# Patient Record
Sex: FEMALE | Race: WHITE | NOT HISPANIC OR LATINO | Employment: OTHER | URBAN - METROPOLITAN AREA
[De-identification: names, ages, dates, MRNs, and addresses within clinical notes are randomized per-mention and may not be internally consistent; named-entity substitution may affect disease eponyms.]

---

## 2023-10-11 ENCOUNTER — HOSPITAL ENCOUNTER (OUTPATIENT)
Dept: RADIOLOGY | Facility: HOSPITAL | Age: 73
Discharge: HOME | End: 2023-10-11
Payer: MEDICARE

## 2023-10-11 VITALS — HEIGHT: 65 IN | WEIGHT: 168 LBS | BODY MASS INDEX: 27.99 KG/M2

## 2023-10-11 DIAGNOSIS — R92.8 OTHER ABNORMAL AND INCONCLUSIVE FINDINGS ON DIAGNOSTIC IMAGING OF BREAST: ICD-10-CM

## 2023-10-11 PROCEDURE — 77065 DX MAMMO INCL CAD UNI: CPT | Mod: RIGHT SIDE | Performed by: RADIOLOGY

## 2023-10-11 PROCEDURE — 77061 BREAST TOMOSYNTHESIS UNI: CPT | Mod: RT

## 2023-10-11 PROCEDURE — 76642 ULTRASOUND BREAST LIMITED: CPT | Mod: RIGHT SIDE | Performed by: RADIOLOGY

## 2023-10-11 PROCEDURE — 76642 ULTRASOUND BREAST LIMITED: CPT | Mod: RT

## 2023-10-11 PROCEDURE — G0279 TOMOSYNTHESIS, MAMMO: HCPCS | Mod: RIGHT SIDE | Performed by: RADIOLOGY

## 2023-10-31 PROBLEM — L84 PRE-ULCERATIVE CORN OR CALLOUS: Status: ACTIVE | Noted: 2023-08-31

## 2023-10-31 PROBLEM — K63.5 COLONIC POLYP: Status: ACTIVE | Noted: 2023-07-26

## 2023-10-31 PROBLEM — H40.003 GLAUCOMA SUSPECT OF BOTH EYES: Status: ACTIVE | Noted: 2023-06-12

## 2023-10-31 PROBLEM — H43.813 VITREOUS DEGENERATION OF BOTH EYES: Status: ACTIVE | Noted: 2023-06-12

## 2023-10-31 PROBLEM — G47.30 SLEEP APNEA: Status: ACTIVE | Noted: 2017-12-08

## 2023-10-31 PROBLEM — I63.9 CEREBRAL INFARCTION (MULTI): Status: ACTIVE | Noted: 2020-09-17

## 2023-10-31 PROBLEM — E06.3 HASHIMOTO'S DISEASE: Status: ACTIVE | Noted: 2021-01-29

## 2023-10-31 PROBLEM — F41.9 ANXIETY: Status: ACTIVE | Noted: 2022-02-18

## 2023-10-31 PROBLEM — M17.9 OSTEOARTHRITIS OF KNEE: Status: ACTIVE | Noted: 2017-12-08

## 2023-10-31 PROBLEM — F41.1 GENERALIZED ANXIETY DISORDER: Status: ACTIVE | Noted: 2020-07-17

## 2023-10-31 PROBLEM — E56.9 VITAMIN DEFICIENCY: Status: ACTIVE | Noted: 2017-12-08

## 2023-10-31 PROBLEM — N60.19 FIBROCYSTIC DISEASE OF BREAST: Status: ACTIVE | Noted: 2017-12-08

## 2023-10-31 PROBLEM — J34.89 NASAL DRYNESS: Status: ACTIVE | Noted: 2020-10-06

## 2023-10-31 PROBLEM — G60.9 IDIOPATHIC PERIPHERAL NEUROPATHY: Status: ACTIVE | Noted: 2017-12-08

## 2023-10-31 PROBLEM — M19.90 OSTEOARTHROSIS: Status: ACTIVE | Noted: 2017-12-08

## 2023-10-31 PROBLEM — E78.5 HYPERLIPIDEMIA: Status: ACTIVE | Noted: 2017-12-08

## 2023-10-31 PROBLEM — E61.1 IRON DEFICIENCY: Status: ACTIVE | Noted: 2023-08-31

## 2023-10-31 PROBLEM — F32.A DEPRESSION: Status: ACTIVE | Noted: 2017-09-15

## 2023-10-31 PROBLEM — Q76.2 CONGENITAL SPONDYLOLISTHESIS: Status: ACTIVE | Noted: 2018-08-30

## 2023-10-31 PROBLEM — R73.01 IMPAIRED FASTING GLUCOSE: Status: ACTIVE | Noted: 2021-01-29

## 2023-10-31 PROBLEM — G47.62 NOCTURNAL LEG CRAMPS: Status: ACTIVE | Noted: 2022-06-08

## 2023-10-31 PROBLEM — T14.8XXA BRUISE: Status: ACTIVE | Noted: 2021-04-22

## 2023-10-31 PROBLEM — R94.31 ABNORMAL EKG: Status: ACTIVE | Noted: 2023-10-31

## 2023-10-31 PROBLEM — R04.0 BLEEDING FROM THE NOSE: Status: ACTIVE | Noted: 2023-07-26

## 2023-10-31 PROBLEM — K42.9 UMBILICAL HERNIA WITHOUT OBSTRUCTION AND WITHOUT GANGRENE: Status: ACTIVE | Noted: 2018-08-30

## 2023-10-31 PROBLEM — M54.50 LOW BACK PAIN: Status: ACTIVE | Noted: 2017-12-08

## 2023-10-31 PROBLEM — E66.3 OVERWEIGHT: Status: ACTIVE | Noted: 2023-07-26

## 2023-10-31 PROBLEM — L71.9 ROSACEA: Status: ACTIVE | Noted: 2017-12-08

## 2023-10-31 PROBLEM — I65.29 CAROTID STENOSIS: Status: ACTIVE | Noted: 2023-10-31

## 2023-10-31 PROBLEM — I25.10 CORONARY ATHEROSCLEROSIS: Status: ACTIVE | Noted: 2017-12-08

## 2023-10-31 PROBLEM — G47.00 INSOMNIA: Status: ACTIVE | Noted: 2017-12-08

## 2023-10-31 PROBLEM — I10 BENIGN ESSENTIAL HYPERTENSION: Status: ACTIVE | Noted: 2017-12-08

## 2023-10-31 PROBLEM — H04.129 TEAR FILM INSUFFICIENCY: Status: ACTIVE | Noted: 2017-12-08

## 2023-10-31 PROBLEM — M79.609 PAIN IN LIMB: Status: ACTIVE | Noted: 2017-12-08

## 2023-10-31 PROBLEM — E04.2 MULTIPLE THYROID NODULES: Status: ACTIVE | Noted: 2023-08-23

## 2023-10-31 PROBLEM — M79.7 FIBROMYALGIA: Status: ACTIVE | Noted: 2017-09-15

## 2023-10-31 PROBLEM — M25.9 JOINT DISORDER OF KNEE: Status: ACTIVE | Noted: 2017-09-15

## 2023-10-31 PROBLEM — M72.2 PLANTAR FASCIITIS: Status: ACTIVE | Noted: 2019-02-15

## 2023-10-31 PROBLEM — H52.203 ASTIGMATISM OF BOTH EYES: Status: ACTIVE | Noted: 2023-06-12

## 2023-10-31 PROBLEM — H52.4 PRESBYOPIA: Status: ACTIVE | Noted: 2023-06-12

## 2023-10-31 PROBLEM — F43.21 GRIEF: Status: ACTIVE | Noted: 2017-09-15

## 2023-10-31 PROBLEM — E55.9 VITAMIN D DEFICIENCY: Status: ACTIVE | Noted: 2023-10-31

## 2023-10-31 PROBLEM — R00.1 SINUS BRADYCARDIA: Status: ACTIVE | Noted: 2023-07-26

## 2023-10-31 PROBLEM — K21.9 GASTROESOPHAGEAL REFLUX DISEASE: Status: ACTIVE | Noted: 2017-12-08

## 2023-10-31 PROBLEM — Z96.1 PSEUDOPHAKIA: Status: ACTIVE | Noted: 2023-06-12

## 2023-10-31 PROBLEM — E78.2 MIXED HYPERLIPIDEMIA: Status: ACTIVE | Noted: 2017-12-08

## 2023-10-31 PROBLEM — L84 PLANTAR CALLOSITY: Status: ACTIVE | Noted: 2023-07-26

## 2023-10-31 PROBLEM — H04.123 DRY EYE SYNDROME OF BOTH EYES: Status: ACTIVE | Noted: 2023-06-12

## 2023-10-31 PROBLEM — I10 REACTIVE HYPERTENSION: Status: ACTIVE | Noted: 2023-07-26

## 2023-10-31 PROBLEM — I77.9 CAROTID ARTERY DISEASE (CMS-HCC): Status: ACTIVE | Noted: 2020-09-17

## 2023-10-31 PROBLEM — H40.9 GLAUCOMA: Status: ACTIVE | Noted: 2023-10-31

## 2023-10-31 PROBLEM — M79.7 FIBROMYOSITIS: Status: ACTIVE | Noted: 2017-12-08

## 2023-10-31 PROBLEM — R79.89 OTHER SPECIFIED ABNORMAL FINDINGS OF BLOOD CHEMISTRY: Status: ACTIVE | Noted: 2018-04-18

## 2023-10-31 PROBLEM — M75.20 BICIPITAL TENOSYNOVITIS: Status: ACTIVE | Noted: 2017-12-08
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
## 2023-10-31 PROBLEM — Q24.9: Status: ACTIVE | Noted: 2023-07-26

## 2023-10-31 PROBLEM — H52.13 MYOPIA, BILATERAL: Status: ACTIVE | Noted: 2023-06-12

## 2023-10-31 RX ORDER — RNA INGREDIENT BNT-162B2 0.23 G/1.8ML
INJECTION, SUSPENSION INTRAMUSCULAR ONCE
COMMUNITY

## 2023-10-31 RX ORDER — CALCIUM CARB/VITAMIN D3/VIT K1 650MG-12.5
1 TABLET,CHEWABLE ORAL
COMMUNITY

## 2023-10-31 RX ORDER — TRAVOPROST OPHTHALMIC SOLUTION 0.04 MG/ML
1 SOLUTION OPHTHALMIC
COMMUNITY

## 2023-10-31 RX ORDER — FLUOXETINE 10 MG/1
10 CAPSULE ORAL DAILY
COMMUNITY
Start: 2014-04-11

## 2023-10-31 RX ORDER — VALSARTAN 160 MG/1
160 TABLET ORAL DAILY
COMMUNITY
Start: 2016-06-17

## 2023-10-31 RX ORDER — ELECTROLYTES/DEXTROSE
5 SOLUTION, ORAL ORAL DAILY
COMMUNITY

## 2023-10-31 RX ORDER — DIAZEPAM 2 MG/1
5 TABLET ORAL EVERY 6 HOURS PRN
COMMUNITY
Start: 2023-07-26

## 2023-10-31 RX ORDER — CARVEDILOL 12.5 MG/1
12.5 TABLET ORAL
COMMUNITY

## 2023-10-31 RX ORDER — MULTIVIT-MIN/IRON FUM/FOLIC AC 7.5 MG-4
1 TABLET ORAL DAILY
COMMUNITY

## 2023-10-31 RX ORDER — ASCORBIC ACID 500 MG
500 TABLET,CHEWABLE ORAL DAILY
COMMUNITY

## 2023-10-31 RX ORDER — AZELAIC ACID 0.15 G/G
1 GEL TOPICAL 2 TIMES DAILY
COMMUNITY

## 2023-10-31 RX ORDER — MULTIVIT WITH IRON,MINERALS
TABLET,CHEWABLE ORAL
COMMUNITY

## 2023-10-31 RX ORDER — CLOPIDOGREL BISULFATE 75 MG/1
75 TABLET ORAL DAILY
COMMUNITY

## 2023-10-31 RX ORDER — CALCIUM CARBONATE/VITAMIN D2 250 MG-125
2 TABLET ORAL 2 TIMES DAILY
COMMUNITY
Start: 2020-10-06

## 2023-10-31 RX ORDER — ACETAMINOPHEN 500 MG
1000 TABLET ORAL DAILY
COMMUNITY

## 2023-10-31 RX ORDER — ROSUVASTATIN CALCIUM 20 MG/1
20 TABLET, COATED ORAL EVERY OTHER DAY
COMMUNITY
Start: 2023-09-29

## 2023-10-31 RX ORDER — VALSARTAN AND HYDROCHLOROTHIAZIDE 160; 12.5 MG/1; MG/1
1 TABLET, FILM COATED ORAL DAILY
COMMUNITY

## 2023-10-31 RX ORDER — LANOLIN ALCOHOL/MO/W.PET/CERES
400 CREAM (GRAM) TOPICAL DAILY
COMMUNITY

## 2023-10-31 RX ORDER — CARVEDILOL 6.25 MG/1
6.25 TABLET ORAL 2 TIMES DAILY
COMMUNITY
Start: 2022-08-17

## 2023-10-31 RX ORDER — CLONAZEPAM 0.5 MG/1
0.5 TABLET ORAL 2 TIMES DAILY PRN
COMMUNITY

## 2023-10-31 RX ORDER — TRETINOIN 0.25 MG/G
CREAM TOPICAL
COMMUNITY
Start: 2017-08-02

## 2023-10-31 RX ORDER — ASPIRIN 325 MG
50000 TABLET, DELAYED RELEASE (ENTERIC COATED) ORAL
COMMUNITY

## 2023-10-31 RX ORDER — PAROXETINE HYDROCHLORIDE HEMIHYDRATE 12.5 MG/1
12.5 TABLET, FILM COATED, EXTENDED RELEASE ORAL
COMMUNITY
Start: 2023-08-22

## 2023-11-14 ENCOUNTER — HOSPITAL ENCOUNTER (OUTPATIENT)
Dept: CARDIOLOGY | Facility: HOSPITAL | Age: 73
Discharge: HOME | End: 2023-11-14
Payer: MEDICARE

## 2023-11-14 DIAGNOSIS — I65.29 STENOSIS OF CAROTID ARTERY, UNSPECIFIED LATERALITY: ICD-10-CM

## 2023-11-14 DIAGNOSIS — I65.23 ARTERIOSCLEROSIS OF CAROTID ARTERY, BILATERAL: ICD-10-CM

## 2023-11-14 PROCEDURE — 93880 EXTRACRANIAL BILAT STUDY: CPT

## 2023-11-17 ENCOUNTER — OFFICE VISIT (OUTPATIENT)
Dept: VASCULAR SURGERY | Facility: CLINIC | Age: 73
End: 2023-11-17
Payer: MEDICARE

## 2023-11-17 VITALS
HEART RATE: 78 BPM | SYSTOLIC BLOOD PRESSURE: 126 MMHG | HEIGHT: 65 IN | BODY MASS INDEX: 27.49 KG/M2 | DIASTOLIC BLOOD PRESSURE: 82 MMHG | WEIGHT: 165 LBS

## 2023-11-17 DIAGNOSIS — I65.22 STENOSIS OF LEFT CAROTID ARTERY: Primary | ICD-10-CM

## 2023-11-17 PROCEDURE — 1160F RVW MEDS BY RX/DR IN RCRD: CPT | Performed by: SURGERY

## 2023-11-17 PROCEDURE — 3074F SYST BP LT 130 MM HG: CPT | Performed by: SURGERY

## 2023-11-17 PROCEDURE — 1159F MED LIST DOCD IN RCRD: CPT | Performed by: SURGERY

## 2023-11-17 PROCEDURE — 1125F AMNT PAIN NOTED PAIN PRSNT: CPT | Performed by: SURGERY

## 2023-11-17 PROCEDURE — 1036F TOBACCO NON-USER: CPT | Performed by: SURGERY

## 2023-11-17 PROCEDURE — 99213 OFFICE O/P EST LOW 20 MIN: CPT | Performed by: SURGERY

## 2023-11-17 PROCEDURE — 3079F DIAST BP 80-89 MM HG: CPT | Performed by: SURGERY

## 2023-11-17 RX ORDER — FERROUS GLUCONATE 325 MG
38 TABLET ORAL
COMMUNITY

## 2023-11-17 RX ORDER — ASCORBIC ACID 125 MG
TABLET,CHEWABLE ORAL
COMMUNITY

## 2023-11-17 ASSESSMENT — ENCOUNTER SYMPTOMS
SHORTNESS OF BREATH: 0
SPEECH DIFFICULTY: 0
DIZZINESS: 0
PSYCHIATRIC NEGATIVE: 1
DEPRESSION: 0
WEAKNESS: 0
CONSTITUTIONAL NEGATIVE: 1
GASTROINTESTINAL NEGATIVE: 1
HEADACHES: 0
COLOR CHANGE: 0
LOSS OF SENSATION IN FEET: 0
BACK PAIN: 0
WOUND: 0
ENDOCRINE NEGATIVE: 1
COUGH: 0
NUMBNESS: 0
EYES NEGATIVE: 1
OCCASIONAL FEELINGS OF UNSTEADINESS: 1
BRUISES/BLEEDS EASILY: 0

## 2023-11-17 NOTE — PROGRESS NOTES
History Of Present Illness  Maegan Shepherd is a 73 y.o. female presenting for carotid follow-up.  She has history of a left CEA in 2020.  Her last follow-up visit was 1 year ago.  She denies any lateralizing symptoms since that time.  Recent carotid duplex reveals less than 50% ICA stenosis bilaterally.     Past Medical History  She has a past medical history of Cellulitis of left upper limb (07/23/2021), Cellulitis of unspecified toe (07/01/2016), Encounter for follow-up examination after completed treatment for conditions other than malignant neoplasm (09/23/2020), Hyperlipidemia, unspecified, Laceration without foreign body, left lower leg, initial encounter (05/12/2016), Occlusion and stenosis of left carotid artery (04/06/2021), Ocular hypertension, unspecified eye, Open bite of unspecified hand, initial encounter (07/23/2021), Open bite, left lower leg, sequela (07/29/2016), Other conditions influencing health status, Other conditions influencing health status, Other conditions influencing health status, Other conditions influencing health status, Other conditions influencing health status, Other conditions influencing health status, Other conditions influencing health status (01/27/2017), Pain in right knee (08/30/2016), Personal history of diseases of the skin and subcutaneous tissue, Personal history of other diseases of the circulatory system, Personal history of other diseases of the circulatory system, Personal history of other diseases of the musculoskeletal system and connective tissue, Personal history of other diseases of the nervous system and sense organs, Personal history of other diseases of the nervous system and sense organs, Personal history of other endocrine, nutritional and metabolic disease, Personal history of other endocrine, nutritional and metabolic disease, Personal history of other medical treatment (06/03/2021), Personal history of other specified conditions (03/25/2013), Personal  history of other specified conditions (10/12/2021), Personal history of other specified conditions (10/12/2021), Personal history of other specified conditions (10/19/2021), Restless legs syndrome, and Sacrococcygeal disorders, not elsewhere classified (12/09/2016).    Surgical History  She has a past surgical history that includes Tubal ligation (11/13/2012); Cholecystectomy (11/13/2012); Other surgical history (04/08/2022); Other surgical history (10/05/2021); Other surgical history (10/05/2021); Other surgical history (10/12/2021); Other surgical history (10/12/2021); Other surgical history (10/12/2021); Other surgical history (10/12/2021); Other surgical history (04/21/2015); MR angio head wo IV contrast (9/11/2020); and MR angio neck wo IV contrast (9/11/2020).     Social History  She reports that she has never smoked. She has never used smokeless tobacco. She reports that she does not drink alcohol and does not use drugs.    Family History  No family history on file.     Allergies  Ibuprofen, Maitake mushroom, Valacyclovir, Amitriptyline, Amoxicillin, Atenolol, Benazepril, Benazepril-hydrochlorothiazide, Esomeprazole, Hydrochlorothiazide, Mushroom, Pitavastatin, Simvastatin, Sulfamethoxazole, Travoprost, Trimethoprim, Amoxicillin-pot clavulanate, Chlorthalidone, Hyoscyamine, Livalo [pitavastatin calcium], and Sulfamethoxazole-trimethoprim    Review of Systems   Constitutional: Negative.    HENT: Negative.     Eyes: Negative.    Respiratory:  Negative for cough and shortness of breath.    Cardiovascular:  Negative for chest pain and leg swelling.   Gastrointestinal: Negative.    Endocrine: Negative.    Genitourinary: Negative.    Musculoskeletal:  Negative for back pain and gait problem.   Skin:  Negative for color change, pallor and wound.   Neurological:  Negative for dizziness, syncope, speech difficulty, weakness, numbness and headaches.   Hematological:  Does not bruise/bleed easily.  "  Psychiatric/Behavioral: Negative.          Physical Exam  Constitutional:       General: She is not in acute distress.     Appearance: Normal appearance. She is normal weight.   HENT:      Head: Normocephalic and atraumatic.   Eyes:      Extraocular Movements: Extraocular movements intact.      Conjunctiva/sclera: Conjunctivae normal.      Pupils: Pupils are equal, round, and reactive to light.   Neck:      Vascular: No carotid bruit.   Cardiovascular:      Rate and Rhythm: Normal rate and regular rhythm.      Pulses: Normal pulses.      Heart sounds: Normal heart sounds.   Pulmonary:      Effort: Pulmonary effort is normal.      Breath sounds: Normal breath sounds.   Abdominal:      General: Abdomen is flat. Bowel sounds are normal.      Palpations: Abdomen is soft.   Musculoskeletal:         General: No swelling. Normal range of motion.      Cervical back: Normal range of motion. No tenderness.   Skin:     General: Skin is warm and dry.   Neurological:      General: No focal deficit present.      Mental Status: She is alert and oriented to person, place, and time.      Cranial Nerves: No cranial nerve deficit.      Sensory: No sensory deficit.      Motor: No weakness.   Psychiatric:         Mood and Affect: Mood normal.         Behavior: Behavior normal.          Last Recorded Vitals  Blood pressure 126/82, pulse 78, height 1.651 m (5' 5\"), weight 74.8 kg (165 lb).    Relevant Results      Current Outpatient Medications:     calcium-vitamin D3-vitamin K (Viactiv) 650 mg-12.5 mcg-40 mcg chewable tablet, Chew 1 tablet once daily., Disp: , Rfl:     cartilage/collagen/bor/hyalur ([employer] ORAL), Take by mouth., Disp: , Rfl:     carvedilol (Coreg) 6.25 mg tablet, Take 1 tablet (6.25 mg) by mouth twice a day., Disp: , Rfl:     ferrous gluconate (Fergon) 324 (38 Fe) mg tablet, Take 1 tablet (38 mg of iron) by mouth once daily with a meal., Disp: , Rfl:     hw-eb-ky-iron-folic-K-herb 293 ([employer]) 18 mg " iron- 240 mcg-120 mcg tablet, Take by mouth once daily., Disp: , Rfl:     PARoxetine CR (Paxil-CR) 12.5 mg 24 hr tablet, Take 1 tablet (12.5 mg) by mouth once daily., Disp: , Rfl:     rosuvastatin (Crestor) 20 mg tablet, Take 1 tablet (20 mg) by mouth every other day., Disp: , Rfl:     SARS-CoV-2 vaccine (Pfizer COVID-19 Vaccine, EUA,) suspension for reconstitution, Inject into the muscle 1 time., Disp: , Rfl:     tretinoin (Retin-A) 0.025 % cream, APPLY TO THE FACE AT NIGHT FOR ROSACEA, Disp: , Rfl:     valsartan (Diovan) 160 mg tablet, Take 1 tablet (160 mg) by mouth once daily., Disp: , Rfl:     ascorbic acid (Vitamin C) 500 mg chewable tablet, Chew 1 tablet (500 mg) once daily., Disp: , Rfl:     azelaic acid (Finacea) 15 % gel, Apply 1 Application topically 2 times a day., Disp: , Rfl:     biotin 5 mg capsule, Take 1 capsule (5 mg) by mouth once daily., Disp: , Rfl:     carvedilol (Coreg) 12.5 mg tablet, Take 1 tablet (12.5 mg) by mouth 2 times a day with meals., Disp: , Rfl:     cholecalciferol (Vitamin D-3) 50,000 unit capsule, Take 1 capsule (50,000 Units) by mouth 1 (one) time per week., Disp: , Rfl:     clonazePAM (KlonoPIN) 0.5 mg tablet, Take 1 tablet (0.5 mg) by mouth 2 times a day as needed., Disp: , Rfl:     clopidogrel (Plavix) 75 mg tablet, Take 1 tablet (75 mg) by mouth once daily., Disp: , Rfl:     diazePAM (Valium) 2 mg tablet, Take 2.5 tablets (5 mg) by mouth every 6 hours if needed., Disp: , Rfl:     FLUoxetine (PROzac) 10 mg capsule, Take 1 capsule (10 mg) by mouth once daily., Disp: , Rfl:     glucosamine-chondroitin 250-200 mg tablet, Take by mouth., Disp: , Rfl:     magnesium oxide (Mag-Ox) 400 mg (241.3 mg magnesium) tablet, Take 1 tablet (400 mg) by mouth once daily., Disp: , Rfl:     multivitamin with minerals (multivit-min-iron fum-folic ac) tablet, Take 1 tablet by mouth once daily., Disp: , Rfl:     omega-3 (Fish Oil) 300-1,000 mg capsule, Take 1 capsule (1,000 mg) by mouth once  daily., Disp: , Rfl:     oxymetazoline (Nasal Spray, oxymetazoline,) 0.05 % nasal spray, Administer 2 sprays into affected nostril(s) twice a day., Disp: , Rfl:     travoprost (Travatan Z) 0.004 % drops ophthalmic solution, 1 drop., Disp: , Rfl:     valsartan-hydrochlorothiazide (Diovan HCT) 160-12.5 mg tablet, Take 1 tablet by mouth once daily., Disp: , Rfl:     vitamin B complex 908-5-217-2-2 mg/mL injection, , Disp: , Rfl:      Vascular US carotid artery duplex bilateral    Result Date: 11/15/2023            [facility] [address] [place] [address]     Tel [phone] Fax [phone]  Vascular Lab Report  VASC US CAROTID ARTERY DUPLEX BILATERAL Patient Name:      ALEXEROS MARTIN    Reading Physician: [id number] VINICIUS Carlisle MD Study Date:        11/14/2023         Ordering Provider: [id number] ANTONINO PEARCE MRN/PID:           [id number]           Fellow: Accession#:        [id number]       Technologist:      Tanja Negron RVT Date of Birth/Age: 1950 / 73      Technologist 2:                    years Gender:            F                  Encounter#:        [id number] Admission Status:  Outpatient         Location           [facility]                                       Performed:  Diagnosis/ICD: Occlusion and stenosis of bilateral carotid arteries-I65.23 Indication:    Carotid Occlusion/Stenosis w/o infarct CPT Codes:     04679 Cerebrovascular Carotid Duplex scan complete  Pertinent History: Carotid surgery.  CONCLUSIONS: Right Carotid: Findings are consistent with less than 50% stenosis of the right proximal internal carotid artery. Laminar flow seen by color Doppler. Right external carotid artery appears patent with no evidence of stenosis. The right vertebral artery is patent with antegrade flow. No evidence of hemodynamically significant stenosis in the right subclavian artery. Left Carotid: Findings are consistent with  less than 50% stenosis of the left proximal internal carotid artery. Left external carotid artery appears patent with no evidence of stenosis. The left vertebral artery is patent with antegrade flow. No evidence of hemodynamically significant stenosis in the left subclavian artery. Endarterectomy: Patent left carotid (s/p endarterectomy) showing smooth proximal and distal endpoints with no evidence of debris or intimal flaps. Velocities are within normal limits, with a mildly turbulent flow pattern in the proximal ICA. Patent left carotid endarterectomy site following endarterectomy.  Comparison: Compared with study from 10/11/2022, no significant change.  Imaging & Doppler Findings: Right Plaque Morph: The proximal right internal carotid artery demonstrates heterogenous and irregular plaque. Left Plaque Morph: The proximal left internal carotid artery demonstrates smooth and homogenous plaque. The distal left common carotid artery demonstrates homogenous and smooth plaque.   Right                        Left   PSV      EDV                PSV      EDV 99 cm/s  7 cm/s    CCA P    100 cm/s 14 cm/s 59 cm/s  9 cm/s    CCA D    89 cm/s  14 cm/s 80 cm/s  13 cm/s   ICA P    64 cm/s  13 cm/s 116 cm/s 27 cm/s   ICA M    92 cm/s  27 cm/s 91 cm/s  20 cm/s   ICA D    108 cm/s 15 cm/s 109 cm/s 3 cm/s     ECA     138 cm/s 15 cm/s 58 cm/s  11 cm/s Vertebral  51 cm/s  9 cm/s 182 cm/s         Subclavian 169 cm/s                Right Left ICA/CCA Ratio  1.4  0.7   [id number] VINICIUS Carlisle MD Electronically signed by [id number] Carlisle MD on 11/15/2023 at 7:38:13 AM  ** Final **        Assessment/Plan   Diagnoses and all orders for this visit:  Stenosis of left carotid artery  -     Vascular US Carotid Artery Duplex Bilateral; Future      74yo female presenting for carotid follow-up.  She has done well since her left carotid endarterectomy in 2020.  She denies any lateralizing symptoms and no focal deficits are noted on exam.  Recent  carotid duplex reveals less than 50% stenosis bilaterally.  I have recommended that she continue medical management and follow-up in 1 year with repeat carotid duplex.       I spent 22 minutes in the professional and overall care of this patient.      Diandra Mandujano MD

## 2024-09-09 ENCOUNTER — APPOINTMENT (OUTPATIENT)
Dept: OBSTETRICS AND GYNECOLOGY | Facility: CLINIC | Age: 74
End: 2024-09-09
Payer: MEDICARE

## 2024-09-09 VITALS
HEIGHT: 65 IN | DIASTOLIC BLOOD PRESSURE: 68 MMHG | BODY MASS INDEX: 28.49 KG/M2 | SYSTOLIC BLOOD PRESSURE: 124 MMHG | WEIGHT: 171 LBS

## 2024-09-09 DIAGNOSIS — Z01.419 WELL WOMAN EXAM WITH ROUTINE GYNECOLOGICAL EXAM: ICD-10-CM

## 2024-09-09 PROCEDURE — 3078F DIAST BP <80 MM HG: CPT | Performed by: OBSTETRICS & GYNECOLOGY

## 2024-09-09 PROCEDURE — 1157F ADVNC CARE PLAN IN RCRD: CPT | Performed by: OBSTETRICS & GYNECOLOGY

## 2024-09-09 PROCEDURE — 3074F SYST BP LT 130 MM HG: CPT | Performed by: OBSTETRICS & GYNECOLOGY

## 2024-09-09 PROCEDURE — 1036F TOBACCO NON-USER: CPT | Performed by: OBSTETRICS & GYNECOLOGY

## 2024-09-09 PROCEDURE — 87624 HPV HI-RISK TYP POOLED RSLT: CPT

## 2024-09-09 PROCEDURE — G0101 CA SCREEN;PELVIC/BREAST EXAM: HCPCS | Performed by: OBSTETRICS & GYNECOLOGY

## 2024-09-09 PROCEDURE — 1159F MED LIST DOCD IN RCRD: CPT | Performed by: OBSTETRICS & GYNECOLOGY

## 2024-09-09 NOTE — PROGRESS NOTES
Subjective   Patient ID: Maegan Shepherd is a 74 y.o. female who presents for Annual Exam.    Last pap: 2/29/16 normal  Last mamm: 10/11/23  LMP: absent  Contraception: menopause  Sexually active: no  Self breast exam: yes  Diet: balanced  Exercise: yes    HPI  Doing well. No complaints.  She is asking for a screening PAP and mammo order.     Review of Systems  Neg   Objective   Physical Exam  Physical Exam         Appearance: Normal appearance. Affect normal and alert  Pulmonary:      Effort: Pulmonary effort is normal. Breath sounds clear  Skin: no rashes or lesions   Breasts:     Breasts bilaterally are symmetrical. No masses or axillary adenopathy. No skin or nipple changes    Abdominal:     Abdomen is flat, soft, nontender. No distension. No mass palpated.      Genitourinary:     Labia: no skin lesions or rash       Urethra: No lesions.      Bladder with no prolapse     Vagina: No discharge, mucosa is pink with no lesions.     Cervix:    mobile and nontender no discharge     Uterus:   Not enlarged, small, nontender.      Adnexa: Bilaterally with  No mass or tenderness.            Extremities:  Nontender, no edema. Normal range of motion    Assessment/Plan   Diagnoses and all orders for this visit:  Well woman exam with routine gynecological exam  PAP and mammo screen order per pt request     MD Melina Baumann, [facility] 09/09/24 1:19 PM

## 2024-09-16 DIAGNOSIS — Z12.39 BREAST SCREENING: Primary | ICD-10-CM

## 2024-09-16 DIAGNOSIS — Z12.31 ENCOUNTER FOR SCREENING MAMMOGRAM FOR MALIGNANT NEOPLASM OF BREAST: ICD-10-CM

## 2024-09-18 LAB
CYTOLOGY CMNT CVX/VAG CYTO-IMP: NORMAL
HPV HR 12 DNA GENITAL QL NAA+PROBE: NEGATIVE
HPV HR GENOTYPES PNL CVX NAA+PROBE: NEGATIVE
HPV16 DNA SPEC QL NAA+PROBE: NEGATIVE
HPV18 DNA SPEC QL NAA+PROBE: NEGATIVE
LAB AP HPV GENOTYPE QUESTION: YES
LAB AP HPV HR: NORMAL
LABORATORY COMMENT REPORT: NORMAL
PATH REPORT.TOTAL CANCER: NORMAL

## 2024-10-02 ENCOUNTER — HOSPITAL ENCOUNTER (OUTPATIENT)
Dept: RADIOLOGY | Facility: CLINIC | Age: 74
Discharge: HOME | End: 2024-10-02
Payer: MEDICARE

## 2024-10-02 VITALS — WEIGHT: 171.08 LBS | BODY MASS INDEX: 28.5 KG/M2 | HEIGHT: 65 IN

## 2024-10-02 DIAGNOSIS — Z12.39 BREAST SCREENING: ICD-10-CM

## 2024-10-02 DIAGNOSIS — Z12.31 ENCOUNTER FOR SCREENING MAMMOGRAM FOR MALIGNANT NEOPLASM OF BREAST: ICD-10-CM

## 2024-10-02 PROCEDURE — 77067 SCR MAMMO BI INCL CAD: CPT

## 2024-10-09 ENCOUNTER — TELEPHONE (OUTPATIENT)
Dept: OBSTETRICS AND GYNECOLOGY | Facility: CLINIC | Age: 74
End: 2024-10-09
Payer: MEDICARE

## 2024-10-09 NOTE — TELEPHONE ENCOUNTER
Pt just wanted to ask if she needed additional testing from her prior mammogram. She got a letter in the mail saying to follow up with her regular GYN to see if she needs anything further.

## 2024-10-10 ENCOUNTER — TELEPHONE (OUTPATIENT)
Dept: OBSTETRICS AND GYNECOLOGY | Facility: CLINIC | Age: 74
End: 2024-10-10

## 2024-10-10 NOTE — TELEPHONE ENCOUNTER
Patient would like to know if she needs further orders for screening due to her history even though her last one came back negative. She would also like a call back sometime today because she has a covid screening tomorrow and would like to cancel it if she does need the further testing.

## 2024-11-15 ENCOUNTER — HOSPITAL ENCOUNTER (OUTPATIENT)
Dept: VASCULAR MEDICINE | Facility: CLINIC | Age: 74
Discharge: HOME | End: 2024-11-15
Payer: MEDICARE

## 2024-11-15 DIAGNOSIS — I65.22 STENOSIS OF LEFT CAROTID ARTERY: ICD-10-CM

## 2024-11-15 PROCEDURE — 93880 EXTRACRANIAL BILAT STUDY: CPT | Performed by: SURGERY

## 2024-11-15 PROCEDURE — 93880 EXTRACRANIAL BILAT STUDY: CPT

## 2024-11-20 ENCOUNTER — APPOINTMENT (OUTPATIENT)
Dept: VASCULAR SURGERY | Facility: CLINIC | Age: 74
End: 2024-11-20
Payer: MEDICARE

## 2024-11-20 VITALS
WEIGHT: 171 LBS | SYSTOLIC BLOOD PRESSURE: 118 MMHG | DIASTOLIC BLOOD PRESSURE: 66 MMHG | HEIGHT: 65 IN | HEART RATE: 60 BPM | BODY MASS INDEX: 28.49 KG/M2 | OXYGEN SATURATION: 98 %

## 2024-11-20 DIAGNOSIS — Z98.890 H/O CAROTID ENDARTERECTOMY: ICD-10-CM

## 2024-11-20 DIAGNOSIS — I65.22 STENOSIS OF LEFT CAROTID ARTERY: Primary | ICD-10-CM

## 2024-11-20 PROCEDURE — 99214 OFFICE O/P EST MOD 30 MIN: CPT | Performed by: SURGERY

## 2024-11-20 PROCEDURE — 3078F DIAST BP <80 MM HG: CPT | Performed by: SURGERY

## 2024-11-20 PROCEDURE — 1157F ADVNC CARE PLAN IN RCRD: CPT | Performed by: SURGERY

## 2024-11-20 PROCEDURE — 1036F TOBACCO NON-USER: CPT | Performed by: SURGERY

## 2024-11-20 PROCEDURE — 1160F RVW MEDS BY RX/DR IN RCRD: CPT | Performed by: SURGERY

## 2024-11-20 PROCEDURE — 1159F MED LIST DOCD IN RCRD: CPT | Performed by: SURGERY

## 2024-11-20 PROCEDURE — 3074F SYST BP LT 130 MM HG: CPT | Performed by: SURGERY

## 2024-11-20 RX ORDER — ACETAMINOPHEN 500 MG
TABLET ORAL EVERY OTHER DAY
COMMUNITY

## 2024-11-20 RX ORDER — ASPIRIN 81 MG/1
81 TABLET ORAL DAILY
COMMUNITY

## 2024-11-20 NOTE — PROGRESS NOTES
History Of Present Illness  Maegan Shepherd is a 74 y.o. female presenting for carotid follow-up. She has history of a left CEA in 2020. Her last follow-up visit was 1 year ago. She denies any lateralizing symptoms since that time. Recent carotid duplex reveals less than 50% ICA stenosis bilaterally.      Past Medical History  She has a past medical history of Cellulitis of left upper limb (07/23/2021), Cellulitis of unspecified toe (07/01/2016), Encounter for follow-up examination after completed treatment for conditions other than malignant neoplasm (09/23/2020), Hyperlipidemia, unspecified, Laceration without foreign body, left lower leg, initial encounter (05/12/2016), Occlusion and stenosis of left carotid artery (04/06/2021), Ocular hypertension, unspecified eye, Open bite of unspecified hand, initial encounter (07/23/2021), Open bite, left lower leg, sequela (07/29/2016), Other conditions influencing health status, Other conditions influencing health status, Other conditions influencing health status, Other conditions influencing health status, Other conditions influencing health status, Other conditions influencing health status, Other conditions influencing health status (01/27/2017), Pain in right knee (08/30/2016), Personal history of diseases of the skin and subcutaneous tissue, Personal history of other diseases of the circulatory system, Personal history of other diseases of the circulatory system, Personal history of other diseases of the musculoskeletal system and connective tissue, Personal history of other diseases of the nervous system and sense organs, Personal history of other diseases of the nervous system and sense organs, Personal history of other endocrine, nutritional and metabolic disease, Personal history of other endocrine, nutritional and metabolic disease, Personal history of other medical treatment (06/03/2021), Personal history of other specified conditions (03/25/2013), Personal  history of other specified conditions (10/12/2021), Personal history of other specified conditions (10/12/2021), Personal history of other specified conditions (10/19/2021), Restless legs syndrome, and Sacrococcygeal disorders, not elsewhere classified (12/09/2016).    Surgical History  She has a past surgical history that includes Tubal ligation (11/13/2012); Cholecystectomy (11/13/2012); Other surgical history (04/08/2022); Other surgical history (10/05/2021); Other surgical history (10/05/2021); Other surgical history (10/12/2021); Other surgical history (10/12/2021); Other surgical history (10/12/2021); Other surgical history (10/12/2021); Other surgical history (04/21/2015); MR angio head wo IV contrast (9/11/2020); and MR angio neck wo IV contrast (9/11/2020).     Social History  She reports that she quit smoking about 46 years ago. Her smoking use included cigarettes. She started smoking about 56 years ago. She has a 2.5 pack-year smoking history. She has never used smokeless tobacco. She reports that she does not drink alcohol and does not use drugs.    Family History  Family History   Problem Relation Name Age of Onset    Hearing loss Father Teodoro     Stroke Father Teodoro     Anesthesia related problems Brother Ant     Hypertension Brother Ant     Breast cancer Mother's Sister Aunt Louisa     Diabetes Father's Sister Sr Kay         Allergies  Ibuprofen, Maitake mushroom, Valacyclovir, Amitriptyline, Amoxicillin, Atenolol, Benazepril, Benazepril-hydrochlorothiazide, Esomeprazole, Hydrochlorothiazide, Pitavastatin, Simvastatin, Sulfamethoxazole, Travoprost, Trimethoprim, Amoxicillin-pot clavulanate, Chlorthalidone, Hyoscyamine, Livalo [pitavastatin calcium], and Sulfamethoxazole-trimethoprim    Review of Systems     Physical Exam  Vitals reviewed.   Constitutional:       General: She is not in acute distress.     Appearance: Normal appearance. She is normal weight.   HENT:      Head: Normocephalic and  "atraumatic.   Eyes:      Extraocular Movements: Extraocular movements intact.      Conjunctiva/sclera: Conjunctivae normal.   Neck:      Vascular: No carotid bruit.   Cardiovascular:      Rate and Rhythm: Normal rate and regular rhythm.      Pulses: Normal pulses.      Heart sounds: Normal heart sounds.   Pulmonary:      Effort: Pulmonary effort is normal.      Breath sounds: Normal breath sounds.   Abdominal:      General: Abdomen is flat.      Palpations: Abdomen is soft.   Musculoskeletal:         General: No swelling. Normal range of motion.      Cervical back: Normal range of motion. No tenderness.   Skin:     General: Skin is warm and dry.   Neurological:      General: No focal deficit present.      Mental Status: She is alert and oriented to person, place, and time.      Cranial Nerves: Cranial nerves 2-12 are intact. No cranial nerve deficit.      Sensory: No sensory deficit.      Motor: Motor function is intact. No weakness.   Psychiatric:         Mood and Affect: Mood normal.         Behavior: Behavior normal.          Last Recorded Vitals  Blood pressure 118/66, pulse 60, height 1.651 m (5' 5\"), weight 77.6 kg (171 lb), SpO2 98%.    Relevant Results      Current Outpatient Medications:     carvedilol (Coreg) 6.25 mg tablet, Take 1 tablet (6.25 mg) by mouth twice a day., Disp: , Rfl:     ferrous gluconate (Fergon) 324 (38 Fe) mg tablet, Take 1 tablet (38 mg of iron) by mouth once daily with breakfast., Disp: , Rfl:     PARoxetine CR (Paxil-CR) 12.5 mg 24 hr tablet, Take 1 tablet (12.5 mg) by mouth once daily., Disp: , Rfl:     rosuvastatin (Crestor) 20 mg tablet, Take 1 tablet (20 mg) by mouth every other day., Disp: , Rfl:     valsartan (Diovan) 160 mg tablet, Take 1 tablet (160 mg) by mouth once daily., Disp: , Rfl:     aspirin 81 mg EC tablet, Take 1 tablet (81 mg) by mouth once daily., Disp: , Rfl:     cholecalciferol (Vitamin D-3) 50 mcg (2,000 unit) capsule, Take by mouth every other day., Disp: , " Rfl:     SARS-CoV-2 vaccine (Pfizer COVID-19 Vaccine, EUA,) suspension for reconstitution, Inject into the muscle 1 time., Disp: , Rfl:        Vascular US Carotid Artery Duplex Bilateral    Result Date: 11/16/2024          [place] [address] Tel [phone] and Fax [phone]  Vascular Lab Report [facility] US CAROTID ARTERY DUPLEX BILATERAL  Patient Name:     ALEX ZHANGBLADIMIR    Reading           [id number] Ajay Mckeon MD,                                      Physician:        MARIANGEL Study Date:       11/15/2024         Ordering          [id number] ANTONINO PEARCE                                      Physician: MRN/PID:          [id number]           Technologist:     Cyndi Lloyd RVT Accession#:       [id number]       Technologist 2: Date of           1950 / 74      Encounter#:       [id number] Birth/Age:        years Gender:           F Admission Status: Outpatient         Location          [facility]                                      Performed:  Diagnosis/ICD: Occlusion and stenosis of left carotid artery-I65.22 Indication:    Carotid Occlusion/Stenosis w/o infarct CPT Codes:     68791 Cerebrovascular Carotid Duplex scan complete  Patient History H/o left CEA.  CONCLUSIONS: Right Carotid: Findings are consistent with less than 50% stenosis of the right proximal internal carotid artery. Right external carotid artery appears patent with no evidence of stenosis. The right vertebral artery is patent with antegrade flow. No evidence of hemodynamically significant stenosis in the right subclavian artery. Left Carotid: Findings are consistent with less than 50% stenosis of the left proximal internal carotid artery. Left external carotid artery appears patent with no evidence of stenosis. The left vertebral artery is patent with antegrade flow. No evidence of hemodynamically significant stenosis in the left subclavian artery.  Comparison: Compared with study from 11/14/2023,  no significant change.  Imaging & Doppler Findings: Right Plaque Morph: The proximal right internal carotid artery demonstrates heterogenous and calcified plaque. The right carotid bulb demonstrates heterogenous and calcified plaque. Left Plaque Morph: The proximal left external carotid artery demonstrates heterogenous plaque.   Right                        Left   PSV      EDV                PSV       cm/s           CCA P    84 cm/s 58 cm/s            CCA D    58 cm/s 60 cm/s  12 cm/s   ICA P    44 cm/s  12 cm/s 79 cm/s  18 cm/s   ICA M    62 cm/s  20 cm/s 89 cm/s  19 cm/s   ICA D    66 cm/s  19 cm/s 112 cm/s            ECA     169 cm/s 35 cm/s  7 cm/s  Vertebral  50 cm/s  12 cm/s 130 cm/s         Subclavian 149 cm/s               Right Left ICA/CCA Ratio  1.0  0.8   [id number] Ajay Mckeon MD, RPVI Electronically signed by [id number] Ajay Mckeon MD, RPVI on 11/16/2024 at 12:11:25 PM  ** Final **            Assessment/Plan   Diagnoses and all orders for this visit:  Stenosis of left carotid artery  -     Vascular US Carotid Artery Duplex Bilateral; Future  H/O carotid endarterectomy  -     Vascular US Carotid Artery Duplex Bilateral; Future      75yo female presenting for carotid follow-up.  She has done well since her left carotid endarterectomy in 2020.  She denies any lateralizing symptoms and no focal deficits are noted on exam.  Recent carotid duplex reveals less than 50% stenosis bilaterally.  I have recommended that she continue medical management and follow-up in 1 year with repeat carotid duplex.        (This note was generated with voice recognition software and may contain errors including spelling, grammar, syntax and missed recognition of what was dictated, of which may not have been fully corrected)        Diandra Mandujano MD

## 2024-12-04 ENCOUNTER — APPOINTMENT (OUTPATIENT)
Dept: RADIOLOGY | Facility: HOSPITAL | Age: 74
End: 2024-12-04
Payer: MEDICARE

## 2024-12-04 ENCOUNTER — HOSPITAL ENCOUNTER (INPATIENT)
Facility: HOSPITAL | Age: 74
LOS: 2 days | Discharge: NURSING, REHAB OR LONG-TERM CARE | End: 2024-12-06
Attending: STUDENT IN AN ORGANIZED HEALTH CARE EDUCATION/TRAINING PROGRAM | Admitting: ORTHOPAEDIC SURGERY
Payer: MEDICARE

## 2024-12-04 ENCOUNTER — APPOINTMENT (OUTPATIENT)
Dept: CARDIOLOGY | Facility: HOSPITAL | Age: 74
End: 2024-12-04
Payer: MEDICARE

## 2024-12-04 DIAGNOSIS — N17.9 ACUTE KIDNEY INJURY (CMS-HCC): ICD-10-CM

## 2024-12-04 DIAGNOSIS — M80.059D HIP FRACTURE DUE TO OSTEOPOROSIS WITH ROUTINE HEALING: ICD-10-CM
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
## 2024-12-04 DIAGNOSIS — I21.4 NON-ST ELEVATION (NSTEMI) MYOCARDIAL INFARCTION (MULTI): ICD-10-CM

## 2024-12-04 DIAGNOSIS — S72.001A CLOSED FRACTURE OF RIGHT HIP, INITIAL ENCOUNTER: Primary | ICD-10-CM

## 2024-12-04 DIAGNOSIS — E04.2 MULTINODULAR THYROID: ICD-10-CM

## 2024-12-04 DIAGNOSIS — R94.31 ABNORMAL EKG: ICD-10-CM

## 2024-12-04 DIAGNOSIS — N17.9 AKI (ACUTE KIDNEY INJURY) (CMS-HCC): ICD-10-CM

## 2024-12-04 PROBLEM — S72.141G CLOSED 2-PART INTERTROCHANTERIC FRACTURE OF PROXIMAL END OF FEMUR WITH DELAYED HEALING, RIGHT: Status: ACTIVE | Noted: 2024-12-04

## 2024-12-04 LAB
ABO GROUP (TYPE) IN BLOOD: NORMAL
ALBUMIN SERPL BCP-MCNC: 4.5 G/DL (ref 3.4–5)
ALP SERPL-CCNC: 87 U/L (ref 33–136)
ALT SERPL W P-5'-P-CCNC: 41 U/L (ref 7–45)
ANION GAP SERPL CALC-SCNC: 16 MMOL/L (ref 10–20)
ANTIBODY SCREEN: NORMAL
APAP SERPL-MCNC: <10 UG/ML
APPEARANCE UR: CLEAR
AST SERPL W P-5'-P-CCNC: 35 U/L (ref 9–39)
BASOPHILS # BLD AUTO: 0.07 X10*3/UL (ref 0–0.1)
BASOPHILS NFR BLD AUTO: 0.4 %
BILIRUB SERPL-MCNC: 1 MG/DL (ref 0–1.2)
BILIRUB UR STRIP.AUTO-MCNC: NEGATIVE MG/DL
BUN SERPL-MCNC: 22 MG/DL (ref 6–23)
CALCIUM SERPL-MCNC: 10.1 MG/DL (ref 8.6–10.3)
CARDIAC TROPONIN I PNL SERPL HS: 117 NG/L (ref 0–13)
CARDIAC TROPONIN I PNL SERPL HS: 33 NG/L (ref 0–13)
CARDIAC TROPONIN I PNL SERPL HS: 89 NG/L (ref 0–13)
CHLORIDE SERPL-SCNC: 102 MMOL/L (ref 98–107)
CK SERPL-CCNC: 87 U/L (ref 0–215)
CO2 SERPL-SCNC: 25 MMOL/L (ref 21–32)
COLOR UR: ABNORMAL
CREAT SERPL-MCNC: 0.94 MG/DL (ref 0.5–1.05)
EGFRCR SERPLBLD CKD-EPI 2021: 63 ML/MIN/1.73M*2
EOSINOPHIL # BLD AUTO: 0.07 X10*3/UL (ref 0–0.4)
EOSINOPHIL NFR BLD AUTO: 0.4 %
ERYTHROCYTE [DISTWIDTH] IN BLOOD BY AUTOMATED COUNT: 12.6 % (ref 11.5–14.5)
ETHANOL SERPL-MCNC: <10 MG/DL
GLUCOSE SERPL-MCNC: 135 MG/DL (ref 74–99)
GLUCOSE UR STRIP.AUTO-MCNC: NORMAL MG/DL
HCT VFR BLD AUTO: 47.5 % (ref 36–46)
HGB BLD-MCNC: 15.6 G/DL (ref 12–16)
IMM GRANULOCYTES # BLD AUTO: 0.09 X10*3/UL (ref 0–0.5)
IMM GRANULOCYTES NFR BLD AUTO: 0.5 % (ref 0–0.9)
INR PPP: 1.1 (ref 0.9–1.1)
KETONES UR STRIP.AUTO-MCNC: ABNORMAL MG/DL
LEUKOCYTE ESTERASE UR QL STRIP.AUTO: NEGATIVE
LYMPHOCYTES # BLD AUTO: 1.33 X10*3/UL (ref 0.8–3)
LYMPHOCYTES NFR BLD AUTO: 7.4 %
MCH RBC QN AUTO: 30.4 PG (ref 26–34)
MCHC RBC AUTO-ENTMCNC: 32.8 G/DL (ref 32–36)
MCV RBC AUTO: 92 FL (ref 80–100)
MONOCYTES # BLD AUTO: 0.53 X10*3/UL (ref 0.05–0.8)
MONOCYTES NFR BLD AUTO: 2.9 %
NEUTROPHILS # BLD AUTO: 15.94 X10*3/UL (ref 1.6–5.5)
NEUTROPHILS NFR BLD AUTO: 88.4 %
NITRITE UR QL STRIP.AUTO: NEGATIVE
NRBC BLD-RTO: 0 /100 WBCS (ref 0–0)
PH UR STRIP.AUTO: 8 [PH]
PLATELET # BLD AUTO: 246 X10*3/UL (ref 150–450)
POTASSIUM SERPL-SCNC: 3.8 MMOL/L (ref 3.5–5.3)
PROT SERPL-MCNC: 7.3 G/DL (ref 6.4–8.2)
PROT UR STRIP.AUTO-MCNC: ABNORMAL MG/DL
PROTHROMBIN TIME: 12.3 SECONDS (ref 9.8–12.8)
RBC # BLD AUTO: 5.14 X10*6/UL (ref 4–5.2)
RBC # UR STRIP.AUTO: NEGATIVE /UL
RBC #/AREA URNS AUTO: ABNORMAL /HPF
RH FACTOR (ANTIGEN D): NORMAL
SALICYLATES SERPL-MCNC: <3 MG/DL
SODIUM SERPL-SCNC: 139 MMOL/L (ref 136–145)
SP GR UR STRIP.AUTO: >1.05
SQUAMOUS #/AREA URNS AUTO: ABNORMAL /HPF
UROBILINOGEN UR STRIP.AUTO-MCNC: NORMAL MG/DL
WBC # BLD AUTO: 18 X10*3/UL (ref 4.4–11.3)
WBC #/AREA URNS AUTO: ABNORMAL /HPF

## 2024-12-04 PROCEDURE — 72128 CT CHEST SPINE W/O DYE: CPT | Mod: RCN | Performed by: RADIOLOGY

## 2024-12-04 PROCEDURE — 72131 CT LUMBAR SPINE W/O DYE: CPT | Mod: RCN

## 2024-12-04 PROCEDURE — 86901 BLOOD TYPING SEROLOGIC RH(D): CPT | Performed by: STUDENT IN AN ORGANIZED HEALTH CARE EDUCATION/TRAINING PROGRAM

## 2024-12-04 PROCEDURE — 84484 ASSAY OF TROPONIN QUANT: CPT | Performed by: STUDENT IN AN ORGANIZED HEALTH CARE EDUCATION/TRAINING PROGRAM

## 2024-12-04 PROCEDURE — 85025 COMPLETE CBC W/AUTO DIFF WBC: CPT | Performed by: STUDENT IN AN ORGANIZED HEALTH CARE EDUCATION/TRAINING PROGRAM

## 2024-12-04 PROCEDURE — 72128 CT CHEST SPINE W/O DYE: CPT | Mod: RCN

## 2024-12-04 PROCEDURE — 2500000001 HC RX 250 WO HCPCS SELF ADMINISTERED DRUGS (ALT 637 FOR MEDICARE OP)

## 2024-12-04 PROCEDURE — 99291 CRITICAL CARE FIRST HOUR: CPT | Performed by: STUDENT IN AN ORGANIZED HEALTH CARE EDUCATION/TRAINING PROGRAM

## 2024-12-04 PROCEDURE — 72131 CT LUMBAR SPINE W/O DYE: CPT | Mod: RCN | Performed by: RADIOLOGY

## 2024-12-04 PROCEDURE — 74177 CT ABD & PELVIS W/CONTRAST: CPT

## 2024-12-04 PROCEDURE — 74177 CT ABD & PELVIS W/CONTRAST: CPT | Performed by: RADIOLOGY

## 2024-12-04 PROCEDURE — 93010 ELECTROCARDIOGRAM REPORT: CPT | Performed by: INTERNAL MEDICINE

## 2024-12-04 PROCEDURE — 2500000004 HC RX 250 GENERAL PHARMACY W/ HCPCS (ALT 636 FOR OP/ED): Performed by: STUDENT IN AN ORGANIZED HEALTH CARE EDUCATION/TRAINING PROGRAM

## 2024-12-04 PROCEDURE — 96375 TX/PRO/DX INJ NEW DRUG ADDON: CPT

## 2024-12-04 PROCEDURE — 80143 DRUG ASSAY ACETAMINOPHEN: CPT | Performed by: STUDENT IN AN ORGANIZED HEALTH CARE EDUCATION/TRAINING PROGRAM

## 2024-12-04 PROCEDURE — 36415 COLL VENOUS BLD VENIPUNCTURE: CPT | Performed by: STUDENT IN AN ORGANIZED HEALTH CARE EDUCATION/TRAINING PROGRAM

## 2024-12-04 PROCEDURE — 72125 CT NECK SPINE W/O DYE: CPT | Performed by: RADIOLOGY

## 2024-12-04 PROCEDURE — 2500000004 HC RX 250 GENERAL PHARMACY W/ HCPCS (ALT 636 FOR OP/ED)

## 2024-12-04 PROCEDURE — 70450 CT HEAD/BRAIN W/O DYE: CPT | Performed by: RADIOLOGY

## 2024-12-04 PROCEDURE — 84484 ASSAY OF TROPONIN QUANT: CPT

## 2024-12-04 PROCEDURE — 80053 COMPREHEN METABOLIC PANEL: CPT | Performed by: STUDENT IN AN ORGANIZED HEALTH CARE EDUCATION/TRAINING PROGRAM

## 2024-12-04 PROCEDURE — 72125 CT NECK SPINE W/O DYE: CPT

## 2024-12-04 PROCEDURE — 85610 PROTHROMBIN TIME: CPT | Performed by: STUDENT IN AN ORGANIZED HEALTH CARE EDUCATION/TRAINING PROGRAM

## 2024-12-04 PROCEDURE — 86900 BLOOD TYPING SEROLOGIC ABO: CPT | Performed by: STUDENT IN AN ORGANIZED HEALTH CARE EDUCATION/TRAINING PROGRAM

## 2024-12-04 PROCEDURE — 80320 DRUG SCREEN QUANTALCOHOLS: CPT | Performed by: STUDENT IN AN ORGANIZED HEALTH CARE EDUCATION/TRAINING PROGRAM

## 2024-12-04 PROCEDURE — 72170 X-RAY EXAM OF PELVIS: CPT

## 2024-12-04 PROCEDURE — 71045 X-RAY EXAM CHEST 1 VIEW: CPT | Performed by: RADIOLOGY

## 2024-12-04 PROCEDURE — 70450 CT HEAD/BRAIN W/O DYE: CPT

## 2024-12-04 PROCEDURE — 81001 URINALYSIS AUTO W/SCOPE: CPT | Performed by: STUDENT IN AN ORGANIZED HEALTH CARE EDUCATION/TRAINING PROGRAM

## 2024-12-04 PROCEDURE — 2550000001 HC RX 255 CONTRASTS: Performed by: STUDENT IN AN ORGANIZED HEALTH CARE EDUCATION/TRAINING PROGRAM

## 2024-12-04 PROCEDURE — 82550 ASSAY OF CK (CPK): CPT | Performed by: STUDENT IN AN ORGANIZED HEALTH CARE EDUCATION/TRAINING PROGRAM

## 2024-12-04 PROCEDURE — 1200000002 HC GENERAL ROOM WITH TELEMETRY DAILY

## 2024-12-04 PROCEDURE — 96374 THER/PROPH/DIAG INJ IV PUSH: CPT

## 2024-12-04 PROCEDURE — 72170 X-RAY EXAM OF PELVIS: CPT | Performed by: RADIOLOGY

## 2024-12-04 PROCEDURE — 71045 X-RAY EXAM CHEST 1 VIEW: CPT

## 2024-12-04 PROCEDURE — G0390 TRAUMA RESPONS W/HOSP CRITI: HCPCS

## 2024-12-04 PROCEDURE — 99291 CRITICAL CARE FIRST HOUR: CPT | Mod: 25 | Performed by: STUDENT IN AN ORGANIZED HEALTH CARE EDUCATION/TRAINING PROGRAM

## 2024-12-04 PROCEDURE — 71260 CT THORAX DX C+: CPT | Performed by: RADIOLOGY

## 2024-12-04 PROCEDURE — 2500000002 HC RX 250 W HCPCS SELF ADMINISTERED DRUGS (ALT 637 FOR MEDICARE OP, ALT 636 FOR OP/ED)

## 2024-12-04 PROCEDURE — 93005 ELECTROCARDIOGRAM TRACING: CPT

## 2024-12-04 RX ORDER — MORPHINE SULFATE 2 MG/ML
2 INJECTION, SOLUTION INTRAMUSCULAR; INTRAVENOUS EVERY 4 HOURS PRN
Status: DISCONTINUED | OUTPATIENT
Start: 2024-12-04 | End: 2024-12-04

## 2024-12-04 RX ORDER — CARVEDILOL 6.25 MG/1
6.25 TABLET ORAL 2 TIMES DAILY
Status: DISCONTINUED | OUTPATIENT
Start: 2024-12-04 | End: 2024-12-06 | Stop reason: HOSPADM

## 2024-12-04 RX ORDER — ROSUVASTATIN CALCIUM 20 MG/1
20 TABLET, COATED ORAL EVERY OTHER DAY
Status: DISCONTINUED | OUTPATIENT
Start: 2024-12-06 | End: 2024-12-06 | Stop reason: HOSPADM

## 2024-12-04 RX ORDER — ONDANSETRON 4 MG/1
4 TABLET, ORALLY DISINTEGRATING ORAL EVERY 8 HOURS PRN
Status: DISCONTINUED | OUTPATIENT
Start: 2024-12-04 | End: 2024-12-06 | Stop reason: HOSPADM

## 2024-12-04 RX ORDER — MORPHINE SULFATE 4 MG/ML
4 INJECTION, SOLUTION INTRAMUSCULAR; INTRAVENOUS EVERY 4 HOURS PRN
Status: DISCONTINUED | OUTPATIENT
Start: 2024-12-04 | End: 2024-12-04

## 2024-12-04 RX ORDER — HEPARIN SODIUM 5000 [USP'U]/ML
5000 INJECTION, SOLUTION INTRAVENOUS; SUBCUTANEOUS EVERY 8 HOURS
Status: DISCONTINUED | OUTPATIENT
Start: 2024-12-04 | End: 2024-12-05

## 2024-12-04 RX ORDER — ACETAMINOPHEN 325 MG/1
650 TABLET ORAL EVERY 6 HOURS PRN
Status: DISCONTINUED | OUTPATIENT
Start: 2024-12-04 | End: 2024-12-06 | Stop reason: HOSPADM

## 2024-12-04 RX ORDER — MORPHINE SULFATE 4 MG/ML
4 INJECTION, SOLUTION INTRAMUSCULAR; INTRAVENOUS ONCE
Status: COMPLETED | OUTPATIENT
Start: 2024-12-04 | End: 2024-12-04

## 2024-12-04 RX ORDER — OXYCODONE HYDROCHLORIDE 5 MG/1
5 TABLET ORAL EVERY 6 HOURS PRN
Status: DISCONTINUED | OUTPATIENT
Start: 2024-12-04 | End: 2024-12-05

## 2024-12-04 RX ORDER — ONDANSETRON HYDROCHLORIDE 2 MG/ML
4 INJECTION, SOLUTION INTRAVENOUS EVERY 8 HOURS PRN
Status: DISCONTINUED | OUTPATIENT
Start: 2024-12-04 | End: 2024-12-05

## 2024-12-04 RX ORDER — HYDRALAZINE HYDROCHLORIDE 20 MG/ML
10 INJECTION INTRAMUSCULAR; INTRAVENOUS ONCE
Status: COMPLETED | OUTPATIENT
Start: 2024-12-04 | End: 2024-12-04

## 2024-12-04 RX ORDER — VALSARTAN 160 MG/1
160 TABLET ORAL DAILY
Status: DISCONTINUED | OUTPATIENT
Start: 2024-12-05 | End: 2024-12-06 | Stop reason: HOSPADM

## 2024-12-04 RX ORDER — FENTANYL CITRATE 50 UG/ML
50 INJECTION, SOLUTION INTRAMUSCULAR; INTRAVENOUS ONCE
Status: COMPLETED | OUTPATIENT
Start: 2024-12-04 | End: 2024-12-04

## 2024-12-04 RX ORDER — FERROUS SULFATE 300 MG/5ML
150 LIQUID (ML) ORAL
Status: DISCONTINUED | OUTPATIENT
Start: 2024-12-05 | End: 2024-12-06 | Stop reason: HOSPADM

## 2024-12-04 RX ORDER — POLYETHYLENE GLYCOL 3350 17 G/17G
17 POWDER, FOR SOLUTION ORAL DAILY
Status: DISCONTINUED | OUTPATIENT
Start: 2024-12-04 | End: 2024-12-05

## 2024-12-04 RX ORDER — PAROXETINE HYDROCHLORIDE 20 MG/1
10 TABLET, FILM COATED ORAL DAILY
Status: DISCONTINUED | OUTPATIENT
Start: 2024-12-04 | End: 2024-12-06 | Stop reason: HOSPADM

## 2024-12-04 RX ORDER — ASPIRIN 81 MG/1
81 TABLET ORAL DAILY
Status: DISCONTINUED | OUTPATIENT
Start: 2024-12-05 | End: 2024-12-06 | Stop reason: HOSPADM

## 2024-12-04 ASSESSMENT — PAIN SCALES - GENERAL
PAINLEVEL_OUTOF10: 0 - NO PAIN
PAINLEVEL_OUTOF10: 0 - NO PAIN
PAINLEVEL_OUTOF10: 6
PAINLEVEL_OUTOF10: 7
PAINLEVEL_OUTOF10: 10 - WORST POSSIBLE PAIN
PAINLEVEL_OUTOF10: 10 - WORST POSSIBLE PAIN
PAINLEVEL_OUTOF10: 0 - NO PAIN
PAINLEVEL_OUTOF10: 1
PAINLEVEL_OUTOF10: 10 - WORST POSSIBLE PAIN

## 2024-12-04 ASSESSMENT — PAIN DESCRIPTION - LOCATION
LOCATION: HIP
LOCATION: HIP

## 2024-12-04 ASSESSMENT — ENCOUNTER SYMPTOMS
HEMATOLOGIC/LYMPHATIC NEGATIVE: 1
NEUROLOGICAL NEGATIVE: 1
EYES NEGATIVE: 1
RESPIRATORY NEGATIVE: 1
MUSCULOSKELETAL NEGATIVE: 1
GASTROINTESTINAL NEGATIVE: 1
CONSTITUTIONAL NEGATIVE: 1
ENDOCRINE NEGATIVE: 1
CARDIOVASCULAR NEGATIVE: 1
PSYCHIATRIC NEGATIVE: 1

## 2024-12-04 ASSESSMENT — LIFESTYLE VARIABLES
EVER HAD A DRINK FIRST THING IN THE MORNING TO STEADY YOUR NERVES TO GET RID OF A HANGOVER: NO
HAVE PEOPLE ANNOYED YOU BY CRITICIZING YOUR DRINKING: NO
HAVE YOU EVER FELT YOU SHOULD CUT DOWN ON YOUR DRINKING: NO
EVER FELT BAD OR GUILTY ABOUT YOUR DRINKING: NO
TOTAL SCORE: 0

## 2024-12-04 ASSESSMENT — PAIN - FUNCTIONAL ASSESSMENT
PAIN_FUNCTIONAL_ASSESSMENT: 0-10

## 2024-12-04 ASSESSMENT — PAIN DESCRIPTION - ORIENTATION
ORIENTATION: RIGHT
ORIENTATION: RIGHT

## 2024-12-04 NOTE — ED NOTES
1511 Limited trauma one push activation by ALVAREZ Holland responded [id number]     Joy Sumner, EMT  12/04/24 1524

## 2024-12-04 NOTE — H&P
History Of Present Illness  Maegan Shepherd is a 74 y.o. female with PMHx HTN,  HLD, CVA 2020,Bilateral carotid artery stenosis S/P left carotid endarterectomy  2020, glaucoma.  She presented to [facility] due to mechanical fall.  Patient states that while she is attempting to get some packages from the back door she tripped at transition between concrete driveway and the grass.  She denies hitting her head, loss of consciousness.  However she was noted to be confused and disoriented by EMS as well as her daughter has concern about being disoriented after that.  Patient denies any fever, headache, seizure.  Additionally she denies any chest pain, shortness of breath, nausea/vomiting, abdominal pain or change in bowel or urinary habits.  She did report she is not on any anticoagulant medication.  Patient denies smoking cigarettes as well as drinking alcohol or using any illicit drugs.      In ED:  Patient was hypertensive /86   HR 71   RR 20   temp 36.3   SaO2 95% RA  -CMP remarkable for glucose 135     troponin 33 which is up trended to 89 and 2-hour  -CBC remarkable for leukocytosis 18  -Chest x-ray revealed no evidence of cardiopulmonary process  -Pelvis x-ray revealed acute comminuted, impacted, displaced and angulated proximal right femoral intertrochanteric fracture.  No dislocation.  -CT head without contrast revealed no acute intracranial abnormality  -CT cervical revealed no acute fracture or subluxation  -CT thoracic spine revealed no acute bony abnormality thoracic spine  -CT lumbar revealed no acute abnormality lumbar spine, at L1-2 there is mild central canal stenosis.  At L3-4 there is moderate central canal stenosis.  -CT chest revealed no acute chest trauma, enlarged right hilar lymph node.  -CT abdomen revealed impacted basicervical fracture right femoral with 1 cm impaction and varus deformity.  This extends into the greater and lesser trochanters.  Also showed constipation  ECG showed sinus  bradycardia, QTc 3 696    Intervention:  Patient received hydralazine 10 mg, morphine, fentanyl and she was admitted for orthopedic evaluation and control blood pressure    Past Medical History  She has a past medical history of Cellulitis of left upper limb (07/23/2021), Cellulitis of unspecified toe (07/01/2016), Encounter for follow-up examination after completed treatment for conditions other than malignant neoplasm (09/23/2020), Hyperlipidemia, unspecified, Laceration without foreign body, left lower leg, initial encounter (05/12/2016), Occlusion and stenosis of left carotid artery (04/06/2021), Ocular hypertension, unspecified eye, Open bite of unspecified hand, initial encounter (07/23/2021), Open bite, left lower leg, sequela (07/29/2016), Other conditions influencing health status, Other conditions influencing health status, Other conditions influencing health status, Other conditions influencing health status, Other conditions influencing health status, Other conditions influencing health status, Other conditions influencing health status (01/27/2017), Pain in right knee (08/30/2016), Personal history of diseases of the skin and subcutaneous tissue, Personal history of other diseases of the circulatory system, Personal history of other diseases of the circulatory system, Personal history of other diseases of the musculoskeletal system and connective tissue, Personal history of other diseases of the nervous system and sense organs, Personal history of other diseases of the nervous system and sense organs, Personal history of other endocrine, nutritional and metabolic disease, Personal history of other endocrine, nutritional and metabolic disease, Personal history of other medical treatment (06/03/2021), Personal history of other specified conditions (03/25/2013), Personal history of other specified conditions (10/12/2021), Personal history of other specified conditions (10/12/2021), Personal history of  other specified conditions (10/19/2021), Restless legs syndrome, and Sacrococcygeal disorders, not elsewhere classified (12/09/2016).    Surgical History  She has a past surgical history that includes Tubal ligation (11/13/2012); Cholecystectomy (11/13/2012); Other surgical history (04/08/2022); Other surgical history (10/05/2021); Other surgical history (10/05/2021); Other surgical history (10/12/2021); Other surgical history (10/12/2021); Other surgical history (10/12/2021); Other surgical history (10/12/2021); Other surgical history (04/21/2015); MR angio head wo IV contrast (9/11/2020); and MR angio neck wo IV contrast (9/11/2020).     Social History  She reports that she quit smoking about 46 years ago. Her smoking use included cigarettes. She started smoking about 56 years ago. She has a 2.5 pack-year smoking history. She has never used smokeless tobacco. She reports that she does not drink alcohol and does not use drugs.    Family History  Family History   Problem Relation Name Age of Onset    Hearing loss Father Teodoro     Stroke Father Teodoro     Anesthesia related problems Brother Ant     Hypertension Brother Ant     Breast cancer Mother's Sister Aunt Louisa     Diabetes Father's Sister Sr Kay         Allergies  [place] mushroom, Valacyclovir, Sulfamethoxazole, Trimethoprim, Amitriptyline, Amoxicillin, Amoxicillin-pot clavulanate, Atenolol, Benazepril, Chlorthalidone, Esomeprazole, Hydrochlorothiazide, Hyoscyamine, Ibuprofen, Livalo [pitavastatin calcium], Pitavastatin, Simvastatin, Sulfamethoxazole-trimethoprim, and Travoprost    Review of Systems   Constitutional: Negative.    HENT: Negative.     Eyes: Negative.    Respiratory: Negative.     Cardiovascular: Negative.    Gastrointestinal: Negative.    Endocrine: Negative.    Genitourinary: Negative.    Musculoskeletal: Negative.    Neurological: Negative.    Hematological: Negative.    Psychiatric/Behavioral: Negative.          Physical  Exam  Constitutional:       Appearance: Normal appearance.   HENT:      Head: Normocephalic and atraumatic.      Mouth/Throat:      Mouth: Mucous membranes are moist.   Cardiovascular:      Rate and Rhythm: Normal rate.      Heart sounds: No murmur heard.     No gallop.   Pulmonary:      Effort: No respiratory distress.      Breath sounds: No wheezing or rales.   Abdominal:      Palpations: Abdomen is soft.      Tenderness: There is no abdominal tenderness. There is no guarding.   Musculoskeletal:         General: Tenderness (IN RIGHT HIP) present. No swelling or deformity.      Right lower leg: No edema.      Left lower leg: No edema.      Comments: Pedal pulses palpable bilaterally    Skin:     General: Skin is warm.   Neurological:      Mental Status: She is alert and oriented to person, place, and time.      Cranial Nerves: No cranial nerve deficit.      Sensory: No sensory deficit.      Motor: No weakness.   Psychiatric:         Mood and Affect: Mood normal.       Last Recorded Vitals  /73   Pulse 71   Temp 36.3 °C (97.3 °F)   Resp 19   Wt 75.8 kg (167 lb)   SpO2 99%     Relevant Results  Scheduled medications     Continuous medications     PRN medications  PRN medications: acetaminophen, HYDROmorphone, HYDROmorphone, morphine, oxyCODONE  Results for orders placed or performed during the hospital encounter of 12/04/24 (from the past 24 hours)   CBC and Auto Differential   Result Value Ref Range    WBC 18.0 (H) 4.4 - 11.3 x10*3/uL    nRBC 0.0 0.0 - 0.0 /100 WBCs    RBC 5.14 4.00 - 5.20 x10*6/uL    Hemoglobin 15.6 12.0 - 16.0 g/dL    Hematocrit 47.5 (H) 36.0 - 46.0 %    MCV 92 80 - 100 fL    MCH 30.4 26.0 - 34.0 pg    MCHC 32.8 32.0 - 36.0 g/dL    RDW 12.6 11.5 - 14.5 %    Platelets 246 150 - 450 x10*3/uL    Neutrophils % 88.4 40.0 - 80.0 %    Immature Granulocytes %, Automated 0.5 0.0 - 0.9 %    Lymphocytes % 7.4 13.0 - 44.0 %    Monocytes % 2.9 2.0 - 10.0 %    Eosinophils % 0.4 0.0 - 6.0 %     Basophils % 0.4 0.0 - 2.0 %    Neutrophils Absolute 15.94 (H) 1.60 - 5.50 x10*3/uL    Immature Granulocytes Absolute, Automated 0.09 0.00 - 0.50 x10*3/uL    Lymphocytes Absolute 1.33 0.80 - 3.00 x10*3/uL    Monocytes Absolute 0.53 0.05 - 0.80 x10*3/uL    Eosinophils Absolute 0.07 0.00 - 0.40 x10*3/uL    Basophils Absolute 0.07 0.00 - 0.10 x10*3/uL   Comprehensive Metabolic Panel   Result Value Ref Range    Glucose 135 (H) 74 - 99 mg/dL    Sodium 139 136 - 145 mmol/L    Potassium 3.8 3.5 - 5.3 mmol/L    Chloride 102 98 - 107 mmol/L    Bicarbonate 25 21 - 32 mmol/L    Anion Gap 16 10 - 20 mmol/L    Urea Nitrogen 22 6 - 23 mg/dL    Creatinine 0.94 0.50 - 1.05 mg/dL    eGFR 63 >60 mL/min/1.73m*2    Calcium 10.1 8.6 - 10.3 mg/dL    Albumin 4.5 3.4 - 5.0 g/dL    Alkaline Phosphatase 87 33 - 136 U/L    Total Protein 7.3 6.4 - 8.2 g/dL    AST 35 9 - 39 U/L    Bilirubin, Total 1.0 0.0 - 1.2 mg/dL    ALT 41 7 - 45 U/L   Troponin I, High Sensitivity   Result Value Ref Range    Troponin I, High Sensitivity 33 (H) 0 - 13 ng/L   Acute Toxicology Panel, Blood   Result Value Ref Range    Acetaminophen <10.0 10.0 - 30.0 ug/mL    Salicylate  <3 4 - 20 mg/dL    Alcohol <10 <=10 mg/dL   Protime-INR   Result Value Ref Range    Protime 12.3 9.8 - 12.8 seconds    INR 1.1 0.9 - 1.1   Type And Screen   Result Value Ref Range    ABO TYPE B     Rh TYPE NEG     ANTIBODY SCREEN NEG    Creatine Kinase   Result Value Ref Range    Creatine Kinase 87 0 - 215 U/L   Troponin I, High Sensitivity   Result Value Ref Range    Troponin I, High Sensitivity 89 (HH) 0 - 13 ng/L     CT chest abdomen pelvis w IV contrast    Result Date: 12/4/2024  Interpreted By:  Elizabet Villalba, STUDY: CT CHEST ABDOMEN PELVIS W IV CONTRAST;  12/4/2024 4:13 pm   INDICATION: Signs/Symptoms:Trauma.   COMPARISON: CT abdomen and pelvis 03/19/2009   ACCESSION NUMBER(S): [id number]   ORDERING CLINICIAN: AWAIS VILLA   TECHNIQUE: CT of the chest, abdomen, and pelvis was performed.  Contiguous axial images were obtained at 3 mm slice thickness through the chest, abdomen and pelvis. Coronal and sagittal reconstructions at 3 mm slice thickness were performed. 90 mL Omnipaque 350   FINDINGS: CHEST:   LUNG/PLEURA/LARGE AIRWAYS: There is no infiltrate or pleural fluid.  There is no pulmonary nodule.   VESSELS: There is enlargement of the main pulmonary outflow tract and right and left main pulmonary arteries which may be secondary to pulmonary artery hypertension.   HEART: The heart is unremarkable.   MEDIASTINUM AND JUSTINE: There is a 1.9 x 1.2 cm enlarged right hilar lymph node.   CHEST WALL AND LOWER NECK: The chest wall is unremarkable. There is no abnormality of the lower neck.   ABDOMEN:   LIVER: There is no hepatic mass.   BILE DUCTS: There is no intrahepatic, common hepatic or common bile ductal dilatation.   GALLBLADDER: Status post cholecystectomy.   PANCREAS: There is no abnormality of the pancreas.   SPLEEN: The spleen is unremarkable. There is no splenic mass. There is no splenomegaly   ADRENAL GLANDS: The adrenal glands are unremarkable.   KIDNEYS AND URETERS:. The kidneys function symmetrically. There is no renal mass. There is no intrarenal calculus or hydronephrosis.     PELVIS:   BLADDER: The bladder is unremarkable.   REPRODUCTIVE ORGANS: There is no abnormality of the reproductive organs.   BOWEL: There is no bowel wall thickening, dilatation or obstruction. There is a large amount of stool throughout the colon. The appendix is normal.   VESSELS: The abdominal and pelvic vessels are unremarkable.   PERITONEUM/RETROPERITONEUM/LYMPH NODES: There is no retroperitoneal or pelvic adenopathy.  There is no ascites.   ABDOMINAL WALL: The abdominal wall is unremarkable.   BONES AND SOFT TISSUES: There is an impacted basicervical fracture of the right femur with 1.0 cm impaction. There is a varus deformity. This extends into the greater and lesser trochanters.   There is no soft tissue  abnormality.       CHEST: 1. No acute chest trauma. 2. Enlarged right hilar lymph node.   ABDOMEN AND PELVIS: 1. Impacted basicervical fracture right femur with 1 cm impaction and varus deformity. This extends into the greater and lesser trochanters. 2. Status post cholecystectomy. 3. Constipation.   MACRO: None   Signed by: Elizabet Villalba 12/4/2024 4:42 PM Dictation workstation:   [id number]    CT lumbar spine wo IV contrast    Result Date: 12/4/2024  Interpreted By:  Elizabet Villalba, STUDY: CT LUMBAR SPINE WO IV CONTRAST  12/4/2024 4:20 pm   INDICATION: Signs/Symptoms:Trauma     COMPARISON: 08/25/2011   ACCESSION NUMBER(S): [id number]   ORDERING CLINICIAN: AWAIS VILLA   TECHNIQUE: Axial CT images of the lumbar spine are obtained. Axial, coronal and sagittal reconstructions are provided for review.   FINDINGS: There is moderate-to-marked anterior osteophytic spurring at all levels. Alignment: Within normal limits.   Vertebrae/Disc Spaces:   The vertebral body heights are intact. The disc spaces are preserved.   Lower Thoracic Spine:  There is no significant central canal stenosis in the included lower thoracic region.   T12-L1:  There is no significant central canal stenosis.   L1-2:  There is moderate ligamentum flavum and facet joint hypertrophy. There is posterior bony spondylosis extending more so to the left of midline. This causes mild central canal stenosis. The neural foramina are patent.   L2-3:  There is moderate ligamentum flavum and facet joint hypertrophy. There is no significant central canal or neural foraminal stenosis.   L3-4:  There is marked ligamentum flavum and facet joint hypertrophy with posterior bony spondylosis and diffuse disc bulge. There is marked central canal stenosis. The neural foramina are patent.   L4-5:  There is marked facet joint hypertrophy with a diffuse disc bulge. There is no central canal stenosis or neural foraminal compromise.   L5-S1: There is marked facet joint  hypertrophy. There is no central canal stenosis or neural foraminal compromise. There is no significant central canal or neural foraminal stenosis.   Prevertebral/Paraspinal Soft Tissues: The prevertebral and paraspinal soft tissues are unremarkable.       1. No acute bony abnormality lumbar spine. 2. At L1-2, there is mild central canal stenosis. 3. At L3-4, there is marked central canal stenosis.   MACRO: None   Signed by: Elizabet Villalba 12/4/2024 4:37 PM Dictation workstation:   [id number]    CT thoracic spine wo IV contrast    Result Date: 12/4/2024  Interpreted By:  Elizabet Villalba, STUDY: CT THORACIC SPINE WO IV CONTRAST;  12/4/2024 4:20 pm   INDICATION: Signs/Symptoms:Trauma.     COMPARISON: None.   ACCESSION NUMBER(S): [id number]   ORDERING CLINICIAN: AWAIS VILLA   TECHNIQUE: Axial CT images of the thoracic spine are obtained. Axial, coronal and sagittal reconstructions are submitted for review.   FINDINGS: There is moderate-to-marked anterior osteophytic spurring of the entire thoracic spine. There is a mild right convex thoracic scoliosis.   Alignment: Within normal limits.   Vertebrae/Intervertebral Discs: The thoracic vertebral body heights are intact. The disc spaces are preserved. There is no significant central canal stenosis.   Paraspinous Soft Tissues: Within normal limits.         No acute bony abnormality thoracic spine.   MACRO: None   Signed by: Elizabet Villalba 12/4/2024 4:33 PM Dictation workstation:   [id number]    CT head W O contrast trauma protocol    Result Date: 12/4/2024  Interpreted By:  Dontae Jason, STUDY: CT HEAD W/O CONTRAST TRAUMA PROTOCOL; CT CERVICAL SPINE WO IV CONTRAST;  12/4/2024 4:06 pm   INDICATION: Trauma. Signs/Symptoms:Trauma.     COMPARISON: September 10, 2020 head CT, September 11, 2020 MRI brain September 10, 2020 CT angiography head neck   ACCESSION NUMBER(S): [id number]; [id number]   ORDERING CLINICIAN: AWAIS VILLA   TECHNIQUE: Axial noncontrast head  and cervical spine CT   FINDINGS: Compared with prior examinations newly seen mild focal encephalomalacia left posterior insular region related to remote infarct.   There is no evidence of acute intra, or extra-axial fluid collection, mass, nor mass effect. There is normal gray-white differentiation.   Paranasal sinuses: No significant abnormality.   Calvarium: No evident fracture.   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   Cervical spine:   Alignment: Similar alignment including about 2 mm C3 anterolisthesis.   Cranial cervical junction: Intact.   Fracture: No acute fracture.   Vertebra and intervertebral disc spaces: Similar multilevel spondylosis and degenerative disc changes contributing to similar degree of canal and foraminal narrowing most pronounced at C5-6.   Paravertebral soft tissues: No evident hematoma. Scattered arterial vascular calcifications. There are metallic clips left neck. No there are bilateral thyroid nodules including dominant right lobe hypodense nodule image 210/14 estimated at 17 mm compared with about 14 mm September 10, 2020.   Brain Injury (BIG) guidelines CT values:   Skull fracture: No SDH (subdural hematoma): None detected EDH (epidural hemtoma): None detected IPH (intraparenchymal hemorrhage): None detected SAH (subarachnoid hemorrhage): None detected IVH (intraventricular hemorrhage): No   Reference: Doe FARFAN, Manuel RS, Titi M, et al. The BIG (brain injury guidelines) project: defining the management of traumatic brain injury by acute care surgeons. J Trauma Acute Care Surg. 2014;76:206y388.       Head: No acute intracranial abnormality was identified.   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   Cervical spine: No acute fracture or subluxation.   Similar moderate to advanced multilevel degenerative changes contributing to canal narrowing most pronounced C5-6.   Multinodular thyroid gland including dominant nodule measuring  about 17 mm right lobe compared with 14 mm 2020 examination. Recommend baseline thyroid ultrasound.   MACRO: Incidental Finding:  There are few small hypoattenuating nodules measuring equal to or greater than 1.5 cm in the thyroid gland. (**-YCF-**)   Instructions:  Further evaluation with nonemergent thyroid ultrasound. (Managing Incidental Thyroid Nodules Detected on Imaging: White Paper of the ACR Incidental Thyroid Findings Committee. Josefina Rader. et al. Journal of the American College of Radiology,Volume 12, Issue 2, 143 - 150.) THYROID.ACR.IF.4     Signed by: Dontae Jason 12/4/2024 4:24 PM Dictation workstation:   [id number]    CT cervical spine wo IV contrast    Result Date: 12/4/2024  Interpreted By:  Dontae Jason, STUDY: CT HEAD W/O CONTRAST TRAUMA PROTOCOL; CT CERVICAL SPINE WO IV CONTRAST;  12/4/2024 4:06 pm   INDICATION: Trauma. Signs/Symptoms:Trauma.     COMPARISON: September 10, 2020 head CT, September 11, 2020 MRI brain September 10, 2020 CT angiography head neck   ACCESSION NUMBER(S): [id number]; [id number]   ORDERING CLINICIAN: AWAIS VILLA   TECHNIQUE: Axial noncontrast head and cervical spine CT   FINDINGS: Compared with prior examinations newly seen mild focal encephalomalacia left posterior insular region related to remote infarct.   There is no evidence of acute intra, or extra-axial fluid collection, mass, nor mass effect. There is normal gray-white differentiation.   Paranasal sinuses: No significant abnormality.   Calvarium: No evident fracture.   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   Cervical spine:   Alignment: Similar alignment including about 2 mm C3 anterolisthesis.   Cranial cervical junction: Intact.   Fracture: No acute fracture.   Vertebra and intervertebral disc spaces: Similar multilevel spondylosis and degenerative disc changes contributing to similar degree of canal and foraminal narrowing most pronounced at C5-6.    Paravertebral soft tissues: No evident hematoma. Scattered arterial vascular calcifications. There are metallic clips left neck. No there are bilateral thyroid nodules including dominant right lobe hypodense nodule image 210/14 estimated at 17 mm compared with about 14 mm September 10, 2020.   Brain Injury (BIG) guidelines CT values:   Skull fracture: No SDH (subdural hematoma): None detected EDH (epidural hemtoma): None detected IPH (intraparenchymal hemorrhage): None detected SAH (subarachnoid hemorrhage): None detected IVH (intraventricular hemorrhage): No   Reference: Doe FARFAN, Manuel RS, Titi M, et al. The BIG (brain injury guidelines) project: defining the management of traumatic brain injury by acute care surgeons. J Trauma Acute Care Surg. 2014;76:054q840.       Head: No acute intracranial abnormality was identified.   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   Cervical spine: No acute fracture or subluxation.   Similar moderate to advanced multilevel degenerative changes contributing to canal narrowing most pronounced C5-6.   Multinodular thyroid gland including dominant nodule measuring about 17 mm right lobe compared with 14 mm 2020 examination. Recommend baseline thyroid ultrasound.   MACRO: Incidental Finding:  There are few small hypoattenuating nodules measuring equal to or greater than 1.5 cm in the thyroid gland. (**-YCF-**)   Instructions:  Further evaluation with nonemergent thyroid ultrasound. (Managing Incidental Thyroid Nodules Detected on Imaging: White Paper of the ACR Incidental Thyroid Findings Committee. Josefina Rader et al. Journal of the American College of Radiology,Volume 12, Issue 2, 143 - 150.) THYROID.[email or web address].4     Signed by: Dontae Jason 12/4/2024 4:24 PM Dictation workstation:   [id number]    XR pelvis 1-2 views    Result Date: 12/4/2024  Interpreted By:  Cecil Byers, STUDY: XR PELVIS 1-2 VIEWS;  12/4/2024 3:32 pm   INDICATION: Signs/Symptoms:trauma,  right hip pain.   COMPARISON: Prior exam is from 04/29/2011..   ACCESSION NUMBER(S): [id number]   ORDERING CLINICIAN: AWAIS VILLA   TECHNIQUE: Portable AP view pelvis was obtained.   FINDINGS: Advanced sclerotic arthritic changes in both SI joints with partial arthritic fusion bilaterally. Distal lumbar spine disc space narrowing with endplate osteophytosis. Bilateral hip joint spaces are preserved. No lytic or blastic destructive bone lesion. There is an acute comminuted fracture through the intertrochanteric proximal right femur with mild medial and proximal migration of the main shaft fragment relative to the main head/neck fragment, and subsequent angulation of those fragments. No dislocation. No other fracture. No opaque soft tissue foreign body. No periosteal reaction or erosion. Moderate stool in the imaged lower colon and rectum.       Acute comminuted, impacted, displaced, and angulated proximal right femoral intertrochanteric fracture. No dislocation.   MACRO: None   Signed by: Cecil Byers 12/4/2024 3:36 PM Dictation workstation:   [id number]    XR chest 1 view    Result Date: 12/4/2024  Interpreted By:  Peter Wilcox, STUDY: XR CHEST 1 VIEW;  12/4/2024 3:31 pm   INDICATION: Signs/Symptoms:trauma.   COMPARISON: Chest radiograph 09/10/2020   ACCESSION NUMBER(S): [id number]   ORDERING CLINICIAN: AWAIS VILLA   FINDINGS:     CARDIOMEDIASTINAL SILHOUETTE: Cardiomediastinal silhouette is normal in size and configuration.   LUNGS: No consolidation, pneumothorax, or significant effusion.   ABDOMEN: No remarkable upper abdominal findings.   BONES: No acute osseous changes.       1.  No evidence of acute cardiopulmonary process.     Signed by: Peter Wilcox 12/4/2024 3:33 PM Dictation workstation:   [id number]    Vascular US Carotid Artery Duplex Bilateral    Result Date: 11/16/2024          [place] [address] Tel [phone] and Fax [phone]  Vascular Lab  Report [facility] CAROTID ARTERY DUPLEX BILATERAL  Patient Name:     ALEX MARTIN    Reading           [id number] Ajay Mckeon MD,                                      Physician:        MARIANGEL Study Date:       11/15/2024         Ordering          [id number] [address]ARCE                                      Physician: MRN/PID:          [id number]           Technologist:     Cyndi Lloyd RVT Accession#:       [id number]       Technologist 2: Date of           1950 / 74      Encounter#:       [id number] Birth/Age:        years Gender:           F Admission Status: Outpatient         Location          [place]                                      Performed:  Diagnosis/ICD: Occlusion and stenosis of left carotid artery-I65.22 Indication:    Carotid Occlusion/Stenosis w/o infarct CPT Codes:     03749 Cerebrovascular Carotid Duplex scan complete  Patient History H/o left CEA.  CONCLUSIONS: Right Carotid: Findings are consistent with less than 50% stenosis of the right proximal internal carotid artery. Right external carotid artery appears patent with no evidence of stenosis. The right vertebral artery is patent with antegrade flow. No evidence of hemodynamically significant stenosis in the right subclavian artery. Left Carotid: Findings are consistent with less than 50% stenosis of the left proximal internal carotid artery. Left external carotid artery appears patent with no evidence of stenosis. The left vertebral artery is patent with antegrade flow. No evidence of hemodynamically significant stenosis in the left subclavian artery.  Comparison: Compared with study from 11/14/2023, no significant change.  Imaging & Doppler Findings: Right Plaque Morph: The proximal right internal carotid artery demonstrates heterogenous and calcified plaque. The right carotid bulb demonstrates heterogenous and calcified plaque. Left Plaque Morph: The proximal left external carotid artery demonstrates heterogenous plaque.   Right                         Left   PSV      EDV                PSV       cm/s           CCA P    84 cm/s 58 cm/s            CCA D    58 cm/s 60 cm/s  12 cm/s   ICA P    44 cm/s  12 cm/s 79 cm/s  18 cm/s   ICA M    62 cm/s  20 cm/s 89 cm/s  19 cm/s   ICA D    66 cm/s  19 cm/s 112 cm/s            ECA     169 cm/s 35 cm/s  7 cm/s  Vertebral  50 cm/s  12 cm/s 130 cm/s         Subclavian 149 cm/s               Right Left ICA/CCA Ratio  1.0  0.8   [id number] Ajay Mckeon MD, RPVI Electronically signed by [id number] Ajay Mckeon MD, RPVI on 11/16/2024 at 12:11:25 PM  ** Final **      Assessment/Plan   Maegan Shepherd is a 74 y.o. female with PMHx HTN, type2 DM, HLD, CVA 2020,Bilateral carotid artery stenosis S/P left carotid endarterectomy  2020, glaucoma.  She presented to [facility] due to mechanical fall.  She denied loss of consciousness, however EMS concern about disorientation after fall.  Denied using any anticoagulant drug.  In ED: She was hypertensive /86 -CMP remarkable for glucose 135     troponin 33 which is up trended to 89 and 2-hour  -CBC remarkable for leukocytosis 18  -Chest x-ray revealed no evidence of cardiopulmonary process  -Pelvis x-ray revealed acute comminuted, impacted, displaced and angulated proximal right femoral intertrochanteric fracture.  No dislocation.  -ECG showed sinus bradycardia, QTc 3 696    # Mechanical fall  -Patient tripped at transition between concrete driveway and the grass.  Patient denies loss of consciousness  -Pedal pulse was normal bilaterally, no right lower limb swelling or change in color.  -Pelvis x-ray revealed acute comminuted, impacted, displaced and angulated proximal right femoral intertrochanteric fracture.  No dislocation.    Plan:  -N.p.o. midnight  -Orthopedic consultation, appreciate recommendation  -Multimodal pain management: Dilaudid for severe pain, oxycodone for moderate pain  -Hold heparin at midnight  -PT/OT    # Hypertension emergency  # Leukocytosis  #  Troponinemia  On admission patient was hypertensive 203/86.  She received 10 mg in ED. no blood pressure 144/66  -Leukocytosis 18 most likely due to stress due to mechanical fall  -Troponin 33 up trended to 89.  Most likely due to demand ischemia secondary to hypertension.  -Repeat troponin after 2-hour    Plan:  -Patient on Tele  -Continue home medication for blood pressure: Losartan and carvedilol  -Urinalysis to exclude UTI  -Continue to monitor CBC daily      # HLD  # Bilateral carotid artery stenosis s/p left carotid endarterectomy  # Hx of CVA  Continue home medication as appropriate      Global plan of care:  Diet: N.p.o. midnight  Consult: Orthopedic  DVT prophylaxis: Heparin, hold in midnight  CODE STATUS: Full code  Disposition: Anticipate 2 to 3 days for operative fixation and placement    This assessment and plan was discussed with senior resident  Geraldine Ny MD  Internal medicine, PGY1    Senior resident addendum:    Patient seen and evaluated independently of intern physician.  I agree with the above documentation.    Patient is a 74-year-old female with PMH significant for KEVIN, HTN, carotid artery disease with carotid stenosis (s/p CEA), CAD, depression, FMA, GERD, glaucoma, HLD, OA, thyroid nodules, ADRI.  Patient presents [facility] for chief complaint of mechanical fall in the driveway where she tripped in an area of her driveway where it change from concrete to grass during package exchange when she went to get back just from her car.  She was down for approximately 15-30 minutes.  Patient denies hitting her head or LOC, however continually endorsing right hip pain and has been unable to ambulate since falling.  Patient denies any feelings of faintness lightheadedness dizziness prior to falling.  Patient denies all other symptomatology pertaining to a 12 point ROS with exception to pain in the right lower extremity at approximately 4/10 severity worsening with movement.  This is in the setting of  recently receiving pain medication.    In the ED:  -Vitals: Temp 97.2 F, HR 62, RR 20, //73 (s/p pain medication and hydralazine), 99% RA  -Labs: CMP: Glucose 135, troponin 33 otherwise unremarkable             Coag: PT/INR 12.3/1.1 (WNL)              CBC: WBC 18.0 (ANC 15.94) otherwise unremarkable.  -Imaging: CXR: No evidence of acute cardiopulmonary process                   XR pelvis 1-2 views: Acute comminuted impacted displaced and angulated proximal right femoral intertrochanteric fracture.  No dislocation.                   CT head without IV contrast: No acute intracranial abnormality was identified.  Remote left insular region infarct                    CT C-spine: No acute fracture or subluxation, similar moderate to advanced multilevel degenerative changes contributing to canal narrowing pronounced at C5-6, multinodular thyroid gland including dominant nodule measuring about 17 mm right lobe compared to 14 mm in 2020.                    CT T-spine: No acute bony abnormality                    CT L-spine: No acute bony abnormality in the lumbar spine, at L1-2 there is mild central canal stenosis, at L3-4 there is marked central canal stenosis                    CT CAP with IV contrast: No acute chest trauma, enlarged right hilar lymph node, impacted basicervical fracture of right femur with 1 cm impaction and varus deformity.  This extends into the greater and lesser trochanters.  S/p cholecystectomy.  Constipation.  -Intervention: Fentanyl 50 mcg, hydralazine 10 mg, morphine 4 mg, orthopedic surgery contacted    General: Not in acute distress, A&O x 3, alert, cooperative, well-developed  HEENT: Normocephalic, atraumatic, EOMI, moist mucous membranes  Neck: Neck supple, trachea midline, no evidence of trauma  Cardiovascular: RRR, S1 and S2 appreciated, no murmurs rubs gallops appreciated, distal pulses 2+ bilaterally (radial and dorsalis pedis)  Respiratory: Vesicular breath sounds appreciated  bilaterally, no adventitious sounds appreciated, no increased work of breathing on room air  GI: Abdomen soft, nondistended, nontender to palpation, bowel sounds present  Extremities: No edema appreciated in lower extremities bilaterally, no cyanosis, right hip grossly deformed tender to palpation, RLE neurovascularly intact.  Neuro: A&O X3, no focal deficits, strength and sensation intact bilaterally  Skin: Warm and dry, without lesions or rashes    #Right impacted basocervical fracture of right femur with 1 cm impaction and varus deformity 2/2 mechanical fall  Patient presented to [facility] status post mechanical fall resulting in hip fracture  -S/p imaging per above    Plan:  -Orthopedic surgery consulted  ---> Plan for surgical fixation on 12/5/2024  -N.p.o.  -Pain management with Tylenol 600 mg every 6 hours as needed for mild pain oxycodone 5 mg every 6 hours as needed for moderate pain, Dilaudid 0.5 mg every 4 hours as needed for severe pain, or breakthrough pain  -PT/OT consulted  -RCRI: 0    #Hypertensive emergency 2/2 stress response + pain with mild troponin elevation  #Leukocytosis 2/2 stress response  Patient presented to [facility] with marked hypertension, initial troponin 33, repeat has been ordered, no ischemic changes on EKG illustrated.  It is likely that this troponin elevation is secondary to blood pressure being severely elevated in a state of hypertensive emergency secondary to pain plus anxiety.    Plan:  -Troponin 33-89  -->Third troponin ordered expect plateau as blood pressure is now better controlled  ----> If troponin has drastic spike we will consider reaching out to cardiology however it is my impression as patient is without chest pain or cardiac symptoms of any nature that this is likely a type II demand troponin leak.  -Telemetry ordered  -Continue to monitor daily CBC  -UA ordered to rule out UTI in the setting of leukocytosis  -No current signs or symptoms of infection, no indication for  antibiotics at this time.    KEVIN  HTN  Carotid artery disease  CAD  HLD  Depression  GERD  Glaucoma  ADRI  -Continue home medications as appropriate    IVF: None at this time  Diet: N.p.o.  DVT prophylaxis: SCDs  Dispo: Anticipate 4 to 5 days hospital stay for fracture fixation as well as PT OT with placement to rehab  Consults: Orthopedic surgery    CODE STATUS:    Patient to be staffed with attending physician.    Oswaldo Damico M.D.  Internal Medicine PGY-2

## 2024-12-04 NOTE — HOSPITAL COURSE
Maegan Shepherd is a 74 y.o. female with PMHx HTN,  HLD, CVA 2020,Bilateral carotid artery stenosis S/P left carotid endarterectomy  2020, glaucoma.  She presented to [facility] due to mechanical fall.  Patient states that while she is attempting to get some packages from the back door she tripped at transition between concrete driveway and the grass.  She denies hitting her head, loss of consciousness.  She is not on anticoagulant.  In the ED she was hypertensive 203/86.  CBC remarkable for leukocytosis 18, CMP remarkable for hyperglycemia 135.  Troponin 33 > 89--127--189.  EKG showed sinus bradycardia, QTc 443.  No medication changes  Pelvis x-ray revealed acute comminuted, impacted, displaced and angulated proximal right femoral intertrochanteric fracture.  No dislocation.   -CT abdomen revealed impacted basicervical fracture right femoral with 1 cm impaction and varus deformity.  This extends into the greater and lesser trochanters.   -In ED he received hydralazine for hypertension, morphine.  Orthopedic was consulted who recommended surgical intervention.  Patient was admitted, cardiology was consulted given up trended troponin, echo was ordered before surgery EF 60-65, unremarkable for ischemia.  Cardiology recommended cardiac workup for further outpatient ischemic workup as outpatient.  Patient underwent surgery 12/5 without any complication.  After surgery patient denies any pain, she can move her feet in the with (was normal bilaterally.  Her lab remarkable for PASCUAL given patient was n.p.o. for long time, hold valsartan.  Patient accepted in acute rehab, discharge to facility to repeat RFP in 1 week,if renal profile come back to normal she can go back to valsartan.  Cardiology recommended ischemic workup two weeks after discharge.  She was discharged to acute rehab in improved condition.

## 2024-12-04 NOTE — ED PROVIDER NOTES
HPI   Chief Complaint   Patient presents with   • Fall       Patient is a 74-year-old female presenting via EMS as limited trauma activation after she had a reported fall in the driveway of her friend's home.  She reports that she was try to get packages out of the vehicle when she tripped over the interchange between the concrete and the grass in the yard.  Was reportedly on the ground for 15 to 30 minutes.  Complaining of right hip pain has been unable to ambulate since the incident.  Denies hitting her head or having any neck or back pain.  She was noted to be confused and disoriented by EMS.  Based on concern for head injury given confusion as well as right hip pain and patient reported that she takes anticoagulation patient activated as limited trauma.  On assessment patient only complaining of right hip pain at this time.  She denies any preceding illness, chest pain, shortness of breath or syncope.  She reports that she has many drug allergies but does not know the specifics regarding them.  She reports that she takes baby aspirin as well as willow bark extract which she has been told thins the blood but denies taking any true antiplatelet or anticoagulant.            Patient History   Past Medical History:   Diagnosis Date   • Cellulitis of left upper limb 07/23/2021    Cellulitis of hand, left   • Cellulitis of unspecified toe 07/01/2016    Cellulitis, toe   • Encounter for follow-up examination after completed treatment for conditions other than malignant neoplasm 09/23/2020    Status post vascular surgery, follow-up exam   • Hyperlipidemia, unspecified     Dyslipidemia   • Laceration without foreign body, left lower leg, initial encounter 05/12/2016    Laceration of leg, left   • Occlusion and stenosis of left carotid artery 04/06/2021    Occlusion and stenosis of left carotid artery   • Ocular hypertension, unspecified eye     Borderline glaucoma with ocular hypertension   • Open bite of unspecified  hand, initial encounter 07/23/2021    Cat bite of hand, initial encounter   • Open bite, left lower leg, sequela 07/29/2016    Dog bite of calf, left, sequela   • Other conditions influencing health status     Chlamydial Pneumonia   • Other conditions influencing health status     Acute Myocardial Infarction   • Other conditions influencing health status     Coronary Artery Disease   • Other conditions influencing health status     X-Ray   • Other conditions influencing health status     X-Ray   • Other conditions influencing health status     Screening for malignant neoplasms, colon   • Other conditions influencing health status 01/27/2017    History of cough   • Pain in right knee 08/30/2016    Right knee pain   • Personal history of diseases of the skin and subcutaneous tissue     History of rosacea   • Personal history of other diseases of the circulatory system     History of congestive heart disease   • Personal history of other diseases of the circulatory system     History of hypertension   • Personal history of other diseases of the musculoskeletal system and connective tissue     History of fibromyositis   • Personal history of other diseases of the nervous system and sense organs     History of migraine headaches   • Personal history of other diseases of the nervous system and sense organs     History of sleep apnea   • Personal history of other endocrine, nutritional and metabolic disease     History of hypothyroidism   • Personal history of other endocrine, nutritional and metabolic disease     History of obesity   • Personal history of other medical treatment 06/03/2021    History of screening mammography   • Personal history of other specified conditions 03/25/2013    History of abnormal mammogram   • Personal history of other specified conditions 10/12/2021    History of headache   • Personal history of other specified conditions 10/12/2021    History of nausea and vomiting   • Personal history of  other specified conditions 10/19/2021    History of chest pain   • Restless legs syndrome     Restless legs syndrome   • Sacrococcygeal disorders, not elsewhere classified 2016    Tail bone pain     Past Surgical History:   Procedure Laterality Date   • CHOLECYSTECTOMY  2012    Cholecystectomy   • MR HEAD ANGIO WO IV CONTRAST  2020    MR HEAD ANGIO WO IV CONTRAST 2020 [facility]ICAL LEGAC[facility]   • MR NECK ANGIO WO IV CONTRAST  2020    MR NECK ANGIO WO IV CONTRAST 2020 [facility] CLINICAL LEGACY   • OTHER SURGICAL HISTORY  2022    Carotid thromboendarterectomy   • OTHER SURGICAL HISTORY  10/05/2021    Foot surgery   • OTHER SURGICAL HISTORY  10/05/2021    Colonoscopy   • OTHER SURGICAL HISTORY  10/12/2021    Cervical loop electrosurgical excision   • OTHER SURGICAL HISTORY  10/12/2021    Internal carotid artery ligation   • OTHER SURGICAL HISTORY  10/12/2021    Mohs surgery   • OTHER SURGICAL HISTORY  10/12/2021    Phacoemulsification of cataract and insertion of intraocular lens   • OTHER SURGICAL HISTORY  2015    Dental Surgery   • TUBAL LIGATION  2012    Tubal Ligation     Family History   Problem Relation Name Age of Onset   • Hearing loss Father Teodoro    • Stroke Father Teodoro    • Anesthesia related problems Brother Ant    • Hypertension Brother Ant    • Breast cancer Mother's Sister Aunt Louisa    • Diabetes Father's Sister Sr Villanueva      Social History     Tobacco Use   • Smoking status: Former     Current packs/day: 0.00     Average packs/day: 0.3 packs/day for 10.0 years (2.5 ttl pk-yrs)     Types: Cigarettes     Start date: 1968     Quit date: 1978     Years since quittin.6   • Smokeless tobacco: Never   • Tobacco comments:     quit on 28 birthday   Substance Use Topics   • Alcohol use: Never   • Drug use: Never       Physical Exam   ED Triage Vitals [24 1515]   Temperature Heart Rate Respirations BP   36.2 °C (97.2 °F) 62 20 175/88      Pulse  Ox Temp Source Heart Rate Source Patient Position   98 % Temporal -- --      BP Location FiO2 (%)     -- --       Physical Exam  Vitals and nursing note reviewed.   Constitutional:       General: She is not in acute distress.     Appearance: She is well-developed. She is not ill-appearing.   HENT:      Head: Normocephalic and atraumatic.      Comments: No sign of raccoon eyes, Holly sign, otorrhea or rhinorrhea.  No hemotympanum.  Midface stable.  No nasal septal hematoma or epistaxis.  No malalignment of jaw or intraoral dentition trauma or lacerations.     Nose: No congestion or rhinorrhea.      Mouth/Throat:      Mouth: Mucous membranes are moist.      Pharynx: No oropharyngeal exudate or posterior oropharyngeal erythema.   Eyes:      Conjunctiva/sclera: Conjunctivae normal.   Neck:      Comments: No midline C-spine, T-spine, L-spine step-off deformity or tenderness.  Cardiovascular:      Rate and Rhythm: Normal rate and regular rhythm.      Pulses: Normal pulses.      Heart sounds: No murmur heard.     No gallop.   Pulmonary:      Effort: Pulmonary effort is normal. No respiratory distress.      Breath sounds: Normal breath sounds. No stridor. No wheezing, rhonchi or rales.   Abdominal:      General: Bowel sounds are normal. There is no distension.      Palpations: Abdomen is soft.      Tenderness: There is no abdominal tenderness. There is no guarding or rebound.   Musculoskeletal:         General: No swelling.      Cervical back: Neck supple.      Comments: Pain with logroll of the right hip.  Pain with range of motion of the right hip.  Otherwise bilateral upper and lower extremities are free of any deformity, neurovascular compromise.  There is 2+ dorsalis pedis and posterior tibial pulses of the right lower extremity.  All compartments are soft.  Pelvis is stable to lateral and AP compression.  Chest wall nontender.  No midline step-off, deformity of the cervical, thoracic, lumbar spine.   Skin:      General: Skin is warm and dry.      Capillary Refill: Capillary refill takes less than 2 seconds.      Findings: No rash.   Neurological:      General: No focal deficit present.      Mental Status: She is alert and oriented to person, place, and time.      Cranial Nerves: No cranial nerve deficit.      Sensory: No sensory deficit.      Gait: Gait normal.      Comments: GCS 15.  Able to answer both simple and complex questions.  Able to give a full report.     Psychiatric:         Mood and Affect: Mood normal.         Behavior: Behavior normal.         Thought Content: Thought content normal.         ED Course & MDM   ED Course as of 12/12/24 1726   Wed Dec 04, 2024   1546 XR pelvis 1-2 views [TL]   1546 XR chest 1 view [TL]   1632 Mildly elevated troponin.  Given hypertension will give hydralazine at this time. [TL]   1633 IMPRESSION: Head: No acute intracranial abnormality was identified. 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 Cervical spine: No acute fracture or subluxation. Similar moderate to advanced multilevel degenerative changes contributing to canal narrowing most pronounced C5-6. Multinodular thyroid gland including dominant nodule measuring about 17 mm right lobe compared with 14 mm 2020 examination. Recommend baseline thyroid ultrasound.    [TL]   1653 ABDOMEN AND PELVIS:  1. Impacted basicervical fracture right femur with 1 cm impaction and  varus deformity. This extends into the greater and lesser trochanters.  2. Status post cholecystectomy.  3. Constipation.   [TL]   1654 Call placed to the trauma surgery team as well as orthopedics for consultation and further recommendations.  Pending repeat troponin, urinalysis. [TL]   1717 Patient and daughter updated at bedside on findings.  They are made aware of her incidental finding of thyroid nodule.  Will plan for hospitalization at this time.  Patient pain is currently 1 out of 10 in severity on repeat assessment. [TL]      ED Course  User Index  [TL] Iban Newton DO         Diagnoses as of 12/12/24 1726   Closed fracture of right hip, initial encounter   Fall, initial encounter   Multinodular thyroid                 No data recorded     [place] Coma Scale Score: 15 (12/04/24 1523 : Mckenzie Suarez, ALEXANDREA)                           Medical Decision Making  74-year-old female presenting as limited trauma activation after reported right hip injury which I am concerned for possible acute fracture as well as confusion/disorientation in the prehospital setting.  She is not disoriented or confused at this time.  There is no witnessed seizure-like activity.  She denies any preceding symptoms of lightheadedness, chest pain or dizziness.  Seems to have a mechanical nature to the fall given the reported fall when she was carrying packages and change from concrete to grass at her friend's home.  Patient given morphine for pain control at this time.  Chest x-ray, pelvis x-ray obtained in the trauma bay after primary and secondary surveys were completed.  Vital signs within normal limits.  It was determined the patient is not truly on anticoagulation however given concern for possible polytrauma including potential head injury per EMS initial evaluation of the patient as well as right hip will obtain CT imaging of the head, spine, chest abdomen pelvis at this time.  Trauma labs obtained.    Case discussed with the on-call trauma surgeon who feels patient is appropriate for internal medicine admission and orthopedics agrees.  Plan for operative fixation tomorrow.  Daughter and patient updated at bedside.  Internal medicine team to Of the patient for hospitalization.    Procedure  Critical Care    Performed by: Iban Newton DO  Authorized by: Iban Newton DO    Critical care provider statement:     Critical care time (minutes):  45    Critical care time was exclusive of:  Separately billable procedures and treating other patients and teaching time     Critical care was necessary to treat or prevent imminent or life-threatening deterioration of the following conditions:  Trauma    Critical care was time spent personally by me on the following activities:  Ordering and performing treatments and interventions, ordering and review of laboratory studies, ordering and review of radiographic studies, pulse oximetry, re-evaluation of patient's condition, review of old charts, obtaining history from patient or surrogate, evaluation of patient's response to treatment and development of treatment plan with patient or surrogate       Iban Newton,   12/05/24 2037       Iban Newton,   12/12/24 1725

## 2024-12-05 ENCOUNTER — APPOINTMENT (OUTPATIENT)
Dept: CARDIOLOGY | Facility: HOSPITAL | Age: 74
End: 2024-12-05
Payer: MEDICARE

## 2024-12-05 ENCOUNTER — ANESTHESIA (OUTPATIENT)
Dept: OPERATING ROOM | Facility: HOSPITAL | Age: 74
End: 2024-12-05
Payer: MEDICARE

## 2024-12-05 ENCOUNTER — APPOINTMENT (OUTPATIENT)
Dept: RADIOLOGY | Facility: HOSPITAL | Age: 74
End: 2024-12-05
Payer: MEDICARE

## 2024-12-05 ENCOUNTER — ANESTHESIA EVENT (OUTPATIENT)
Dept: OPERATING ROOM | Facility: HOSPITAL | Age: 74
End: 2024-12-05
Payer: MEDICARE

## 2024-12-05 PROBLEM — M80.059D HIP FRACTURE DUE TO OSTEOPOROSIS WITH ROUTINE HEALING: Status: ACTIVE | Noted: 2024-12-05

## 2024-12-05 LAB
ALBUMIN SERPL BCP-MCNC: 4.2 G/DL (ref 3.4–5)
ANION GAP SERPL CALC-SCNC: 14 MMOL/L (ref 10–20)
AORTIC VALVE MEAN GRADIENT: 6 MMHG
AORTIC VALVE PEAK VELOCITY: 1.58 M/S
ATRIAL RATE: 52 BPM
ATRIAL RATE: 55 BPM
ATRIAL RATE: 56 BPM
AV PEAK GRADIENT: 10 MMHG
AVA (PEAK VEL): 2.36 CM2
AVA (VTI): 2.46 CM2
BODY SURFACE AREA: 1.91 M2
BUN SERPL-MCNC: 25 MG/DL (ref 6–23)
CALCIUM SERPL-MCNC: 9.6 MG/DL (ref 8.6–10.3)
CARDIAC TROPONIN I PNL SERPL HS: 183 NG/L (ref 0–13)
CHLORIDE SERPL-SCNC: 103 MMOL/L (ref 98–107)
CO2 SERPL-SCNC: 25 MMOL/L (ref 21–32)
CREAT SERPL-MCNC: 0.98 MG/DL (ref 0.5–1.05)
EGFRCR SERPLBLD CKD-EPI 2021: 60 ML/MIN/1.73M*2
EJECTION FRACTION APICAL 4 CHAMBER: 77
ERYTHROCYTE [DISTWIDTH] IN BLOOD BY AUTOMATED COUNT: 13 % (ref 11.5–14.5)
GLUCOSE SERPL-MCNC: 128 MG/DL (ref 74–99)
HCT VFR BLD AUTO: 46 % (ref 36–46)
HGB BLD-MCNC: 14.5 G/DL (ref 12–16)
HOLD SPECIMEN: NORMAL
LEFT VENTRICLE INTERNAL DIMENSION DIASTOLE: 4.15 CM (ref 3.5–6)
LEFT VENTRICULAR OUTFLOW TRACT DIAMETER: 1.84 CM
LV EJECTION FRACTION BIPLANE: 87 %
MAGNESIUM SERPL-MCNC: 2.34 MG/DL (ref 1.6–2.4)
MCH RBC QN AUTO: 29.7 PG (ref 26–34)
MCHC RBC AUTO-ENTMCNC: 31.5 G/DL (ref 32–36)
MCV RBC AUTO: 94 FL (ref 80–100)
MITRAL VALVE E/A RATIO: 1.09
NRBC BLD-RTO: 0 /100 WBCS (ref 0–0)
P AXIS: 56 DEGREES
P AXIS: 61 DEGREES
P AXIS: 63 DEGREES
P OFFSET: 199 MS
P OFFSET: 202 MS
P OFFSET: 209 MS
P ONSET: 147 MS
P ONSET: 148 MS
P ONSET: 149 MS
PHOSPHATE SERPL-MCNC: 4.3 MG/DL (ref 2.5–4.9)
PLATELET # BLD AUTO: 223 X10*3/UL (ref 150–450)
POTASSIUM SERPL-SCNC: 4.1 MMOL/L (ref 3.5–5.3)
PR INTERVAL: 142 MS
PR INTERVAL: 142 MS
PR INTERVAL: 144 MS
Q ONSET: 218 MS
Q ONSET: 220 MS
Q ONSET: 220 MS
QRS COUNT: 9 BEATS
QRS DURATION: 90 MS
QRS DURATION: 90 MS
QRS DURATION: 94 MS
QT INTERVAL: 426 MS
QT INTERVAL: 474 MS
QT INTERVAL: 478 MS
QTC CALCULATION(BAZETT): 396 MS
QTC CALCULATION(BAZETT): 457 MS
QTC CALCULATION(BAZETT): 457 MS
QTC FREDERICIA: 406 MS
QTC FREDERICIA: 463 MS
QTC FREDERICIA: 464 MS
R AXIS: 59 DEGREES
R AXIS: 60 DEGREES
R AXIS: 64 DEGREES
RBC # BLD AUTO: 4.89 X10*6/UL (ref 4–5.2)
RIGHT VENTRICLE PEAK SYSTOLIC PRESSURE: 22.3 MMHG
SODIUM SERPL-SCNC: 138 MMOL/L (ref 136–145)
T AXIS: 32 DEGREES
T AXIS: 37 DEGREES
T AXIS: 37 DEGREES
T OFFSET: 431 MS
T OFFSET: 457 MS
T OFFSET: 459 MS
VENTRICULAR RATE: 52 BPM
VENTRICULAR RATE: 55 BPM
VENTRICULAR RATE: 56 BPM
WBC # BLD AUTO: 12.8 X10*3/UL (ref 4.4–11.3)

## 2024-12-05 PROCEDURE — 85027 COMPLETE CBC AUTOMATED: CPT

## 2024-12-05 PROCEDURE — 99100 ANES PT EXTEME AGE<1 YR&>70: CPT | Performed by: ANESTHESIOLOGY

## 2024-12-05 PROCEDURE — 0QS606Z REPOSITION RIGHT UPPER FEMUR WITH INTRAMEDULLARY INTERNAL FIXATION DEVICE, OPEN APPROACH: ICD-10-PCS | Performed by: ORTHOPAEDIC SURGERY

## 2024-12-05 PROCEDURE — 99223 1ST HOSP IP/OBS HIGH 75: CPT

## 2024-12-05 PROCEDURE — 2780000003 HC OR 278 NO HCPCS: Performed by: ORTHOPAEDIC SURGERY

## 2024-12-05 PROCEDURE — 2500000004 HC RX 250 GENERAL PHARMACY W/ HCPCS (ALT 636 FOR OP/ED)

## 2024-12-05 PROCEDURE — 99222 1ST HOSP IP/OBS MODERATE 55: CPT | Performed by: INTERNAL MEDICINE

## 2024-12-05 PROCEDURE — C1713 ANCHOR/SCREW BN/BN,TIS/BN: HCPCS | Performed by: ORTHOPAEDIC SURGERY

## 2024-12-05 PROCEDURE — 7100000002 HC RECOVERY ROOM TIME - EACH INCREMENTAL 1 MINUTE: Performed by: ORTHOPAEDIC SURGERY

## 2024-12-05 PROCEDURE — 3600000009 HC OR TIME - EACH INCREMENTAL 1 MINUTE - PROCEDURE LEVEL FOUR: Performed by: ORTHOPAEDIC SURGERY

## 2024-12-05 PROCEDURE — 93005 ELECTROCARDIOGRAM TRACING: CPT

## 2024-12-05 PROCEDURE — 84484 ASSAY OF TROPONIN QUANT: CPT

## 2024-12-05 PROCEDURE — 2500000001 HC RX 250 WO HCPCS SELF ADMINISTERED DRUGS (ALT 637 FOR MEDICARE OP): Performed by: PHYSICIAN ASSISTANT

## 2024-12-05 PROCEDURE — B24BZZZ ULTRASONOGRAPHY OF HEART WITH AORTA: ICD-10-PCS | Performed by: INTERNAL MEDICINE

## 2024-12-05 PROCEDURE — 2500000002 HC RX 250 W HCPCS SELF ADMINISTERED DRUGS (ALT 637 FOR MEDICARE OP, ALT 636 FOR OP/ED)

## 2024-12-05 PROCEDURE — 3700000001 HC GENERAL ANESTHESIA TIME - INITIAL BASE CHARGE: Performed by: ORTHOPAEDIC SURGERY

## 2024-12-05 PROCEDURE — 2500000004 HC RX 250 GENERAL PHARMACY W/ HCPCS (ALT 636 FOR OP/ED): Performed by: PHYSICIAN ASSISTANT

## 2024-12-05 PROCEDURE — 80069 RENAL FUNCTION PANEL: CPT

## 2024-12-05 PROCEDURE — 1200000002 HC GENERAL ROOM WITH TELEMETRY DAILY

## 2024-12-05 PROCEDURE — 27245 TREAT THIGH FRACTURE: CPT | Performed by: PHYSICIAN ASSISTANT

## 2024-12-05 PROCEDURE — 73552 X-RAY EXAM OF FEMUR 2/>: CPT | Mod: RIGHT SIDE | Performed by: RADIOLOGY

## 2024-12-05 PROCEDURE — A27245 PR OPEN FIX INTER/SUBTROCH FX,IMPLNT: Performed by: NURSE ANESTHETIST, CERTIFIED REGISTERED

## 2024-12-05 PROCEDURE — 2500000005 HC RX 250 GENERAL PHARMACY W/O HCPCS

## 2024-12-05 PROCEDURE — 36415 COLL VENOUS BLD VENIPUNCTURE: CPT

## 2024-12-05 PROCEDURE — 27245 TREAT THIGH FRACTURE: CPT | Performed by: ORTHOPAEDIC SURGERY

## 2024-12-05 PROCEDURE — 2720000007 HC OR 272 NO HCPCS: Performed by: ORTHOPAEDIC SURGERY

## 2024-12-05 PROCEDURE — 73552 X-RAY EXAM OF FEMUR 2/>: CPT | Mod: RT

## 2024-12-05 PROCEDURE — 7100000001 HC RECOVERY ROOM TIME - INITIAL BASE CHARGE: Performed by: ORTHOPAEDIC SURGERY

## 2024-12-05 PROCEDURE — 99221 1ST HOSP IP/OBS SF/LOW 40: CPT | Performed by: ORTHOPAEDIC SURGERY

## 2024-12-05 PROCEDURE — 7100000011 HC EXTENDED STAY RECOVERY HOURLY - NURSING UNIT

## 2024-12-05 PROCEDURE — 93325 DOPPLER ECHO COLOR FLOW MAPG: CPT

## 2024-12-05 PROCEDURE — 2500000001 HC RX 250 WO HCPCS SELF ADMINISTERED DRUGS (ALT 637 FOR MEDICARE OP)

## 2024-12-05 PROCEDURE — 83735 ASSAY OF MAGNESIUM: CPT

## 2024-12-05 PROCEDURE — 3700000002 HC GENERAL ANESTHESIA TIME - EACH INCREMENTAL 1 MINUTE: Performed by: ORTHOPAEDIC SURGERY

## 2024-12-05 PROCEDURE — 3600000004 HC OR TIME - INITIAL BASE CHARGE - PROCEDURE LEVEL FOUR: Performed by: ORTHOPAEDIC SURGERY

## 2024-12-05 PROCEDURE — C1769 GUIDE WIRE: HCPCS | Performed by: ORTHOPAEDIC SURGERY

## 2024-12-05 PROCEDURE — 2500000004 HC RX 250 GENERAL PHARMACY W/ HCPCS (ALT 636 FOR OP/ED): Performed by: NURSE ANESTHETIST, CERTIFIED REGISTERED

## 2024-12-05 PROCEDURE — A27245 PR OPEN FIX INTER/SUBTROCH FX,IMPLNT: Performed by: ANESTHESIOLOGY

## 2024-12-05 PROCEDURE — 93321 DOPPLER ECHO F-UP/LMTD STD: CPT

## 2024-12-05 PROCEDURE — 96372 THER/PROPH/DIAG INJ SC/IM: CPT | Performed by: PHYSICIAN ASSISTANT

## 2024-12-05 DEVICE — 10MM/130 DEG TI CANN TFNA 170MM - STERILE
Type: IMPLANTABLE DEVICE | Site: FEMUR | Status: FUNCTIONAL
Brand: TFN-ADVANCE

## 2024-12-05 DEVICE — TFNA FENESTRATED HELICAL BLADE 90MM - STERILE
Type: IMPLANTABLE DEVICE | Site: HIP | Status: FUNCTIONAL
Brand: TFN-ADVANCE

## 2024-12-05 DEVICE — 5.0MM TI LOCKING SCREW W/T25 STARDRIVE 32MM F/IM NAIL-STER: Type: IMPLANTABLE DEVICE | Site: FEMUR | Status: FUNCTIONAL

## 2024-12-05 RX ORDER — ROCURONIUM BROMIDE 10 MG/ML
INJECTION, SOLUTION INTRAVENOUS AS NEEDED
Status: DISCONTINUED | OUTPATIENT
Start: 2024-12-05 | End: 2024-12-05

## 2024-12-05 RX ORDER — SODIUM CHLORIDE, SODIUM LACTATE, POTASSIUM CHLORIDE, CALCIUM CHLORIDE 600; 310; 30; 20 MG/100ML; MG/100ML; MG/100ML; MG/100ML
100 INJECTION, SOLUTION INTRAVENOUS CONTINUOUS
Status: CANCELLED | OUTPATIENT
Start: 2024-12-05 | End: 2024-12-06

## 2024-12-05 RX ORDER — PROCHLORPERAZINE EDISYLATE 5 MG/ML
10 INJECTION INTRAMUSCULAR; INTRAVENOUS EVERY 6 HOURS PRN
Status: DISCONTINUED | OUTPATIENT
Start: 2024-12-05 | End: 2024-12-06 | Stop reason: HOSPADM

## 2024-12-05 RX ORDER — OXYCODONE HYDROCHLORIDE 10 MG/1
10 TABLET ORAL EVERY 6 HOURS PRN
Status: DISCONTINUED | OUTPATIENT
Start: 2024-12-05 | End: 2024-12-06 | Stop reason: HOSPADM

## 2024-12-05 RX ORDER — LIDOCAINE HYDROCHLORIDE 20 MG/ML
INJECTION, SOLUTION INFILTRATION; PERINEURAL AS NEEDED
Status: DISCONTINUED | OUTPATIENT
Start: 2024-12-05 | End: 2024-12-05

## 2024-12-05 RX ORDER — ACETAMINOPHEN 325 MG/1
650 TABLET ORAL EVERY 6 HOURS SCHEDULED
Status: DISCONTINUED | OUTPATIENT
Start: 2024-12-05 | End: 2024-12-06 | Stop reason: HOSPADM

## 2024-12-05 RX ORDER — LABETALOL HYDROCHLORIDE 5 MG/ML
5 INJECTION, SOLUTION INTRAVENOUS ONCE AS NEEDED
Status: DISCONTINUED | OUTPATIENT
Start: 2024-12-05 | End: 2024-12-05 | Stop reason: HOSPADM

## 2024-12-05 RX ORDER — OXYCODONE HYDROCHLORIDE 5 MG/1
5 TABLET ORAL EVERY 4 HOURS PRN
Status: DISCONTINUED | OUTPATIENT
Start: 2024-12-05 | End: 2024-12-06 | Stop reason: HOSPADM

## 2024-12-05 RX ORDER — DOCUSATE SODIUM 100 MG/1
100 CAPSULE, LIQUID FILLED ORAL 2 TIMES DAILY
Status: DISCONTINUED | OUTPATIENT
Start: 2024-12-05 | End: 2024-12-06 | Stop reason: HOSPADM

## 2024-12-05 RX ORDER — PROPOFOL 10 MG/ML
INJECTION, EMULSION INTRAVENOUS AS NEEDED
Status: DISCONTINUED | OUTPATIENT
Start: 2024-12-05 | End: 2024-12-05

## 2024-12-05 RX ORDER — DIPHENHYDRAMINE HYDROCHLORIDE 50 MG/ML
12.5 INJECTION INTRAMUSCULAR; INTRAVENOUS ONCE AS NEEDED
Status: DISCONTINUED | OUTPATIENT
Start: 2024-12-05 | End: 2024-12-05 | Stop reason: HOSPADM

## 2024-12-05 RX ORDER — POLYETHYLENE GLYCOL 3350 17 G/17G
17 POWDER, FOR SOLUTION ORAL DAILY
Status: DISCONTINUED | OUTPATIENT
Start: 2024-12-05 | End: 2024-12-06 | Stop reason: HOSPADM

## 2024-12-05 RX ORDER — PROCHLORPERAZINE 25 MG/1
25 SUPPOSITORY RECTAL EVERY 12 HOURS PRN
Status: DISCONTINUED | OUTPATIENT
Start: 2024-12-05 | End: 2024-12-06 | Stop reason: HOSPADM

## 2024-12-05 RX ORDER — SODIUM CHLORIDE 9 MG/ML
75 INJECTION, SOLUTION INTRAVENOUS CONTINUOUS
Status: DISCONTINUED | OUTPATIENT
Start: 2024-12-05 | End: 2024-12-05

## 2024-12-05 RX ORDER — BISACODYL 10 MG/1
10 SUPPOSITORY RECTAL DAILY PRN
Status: DISCONTINUED | OUTPATIENT
Start: 2024-12-05 | End: 2024-12-06 | Stop reason: HOSPADM

## 2024-12-05 RX ORDER — KETOROLAC TROMETHAMINE 15 MG/ML
15 INJECTION, SOLUTION INTRAMUSCULAR; INTRAVENOUS EVERY 6 HOURS
Status: DISCONTINUED | OUTPATIENT
Start: 2024-12-05 | End: 2024-12-05

## 2024-12-05 RX ORDER — TRANEXAMIC ACID 650 MG/1
1950 TABLET ORAL ONCE
Status: DISCONTINUED | OUTPATIENT
Start: 2024-12-06 | End: 2024-12-05

## 2024-12-05 RX ORDER — CEFAZOLIN 1 G/1
INJECTION, POWDER, FOR SOLUTION INTRAVENOUS AS NEEDED
Status: DISCONTINUED | OUTPATIENT
Start: 2024-12-05 | End: 2024-12-05

## 2024-12-05 RX ORDER — NALOXONE HYDROCHLORIDE 0.4 MG/ML
0.2 INJECTION, SOLUTION INTRAMUSCULAR; INTRAVENOUS; SUBCUTANEOUS EVERY 5 MIN PRN
Status: DISCONTINUED | OUTPATIENT
Start: 2024-12-05 | End: 2024-12-06 | Stop reason: HOSPADM

## 2024-12-05 RX ORDER — ONDANSETRON HYDROCHLORIDE 2 MG/ML
4 INJECTION, SOLUTION INTRAVENOUS EVERY 8 HOURS PRN
Status: DISCONTINUED | OUTPATIENT
Start: 2024-12-05 | End: 2024-12-06 | Stop reason: HOSPADM

## 2024-12-05 RX ORDER — OXYCODONE HYDROCHLORIDE 5 MG/1
5 TABLET ORAL EVERY 4 HOURS PRN
Status: DISCONTINUED | OUTPATIENT
Start: 2024-12-05 | End: 2024-12-05 | Stop reason: HOSPADM

## 2024-12-05 RX ORDER — ACETAMINOPHEN 325 MG/1
975 TABLET ORAL ONCE
Status: CANCELLED | OUTPATIENT
Start: 2024-12-05 | End: 2024-12-05

## 2024-12-05 RX ORDER — MORPHINE SULFATE 2 MG/ML
2 INJECTION, SOLUTION INTRAMUSCULAR; INTRAVENOUS EVERY 2 HOUR PRN
Status: DISCONTINUED | OUTPATIENT
Start: 2024-12-05 | End: 2024-12-06 | Stop reason: HOSPADM

## 2024-12-05 RX ORDER — FENTANYL CITRATE 50 UG/ML
INJECTION, SOLUTION INTRAMUSCULAR; INTRAVENOUS AS NEEDED
Status: DISCONTINUED | OUTPATIENT
Start: 2024-12-05 | End: 2024-12-05

## 2024-12-05 RX ORDER — BISACODYL 5 MG
10 TABLET, DELAYED RELEASE (ENTERIC COATED) ORAL DAILY PRN
Status: DISCONTINUED | OUTPATIENT
Start: 2024-12-05 | End: 2024-12-06 | Stop reason: HOSPADM

## 2024-12-05 RX ORDER — ONDANSETRON HYDROCHLORIDE 2 MG/ML
4 INJECTION, SOLUTION INTRAVENOUS ONCE AS NEEDED
Status: DISCONTINUED | OUTPATIENT
Start: 2024-12-05 | End: 2024-12-05 | Stop reason: HOSPADM

## 2024-12-05 RX ORDER — ONDANSETRON HYDROCHLORIDE 2 MG/ML
INJECTION, SOLUTION INTRAVENOUS AS NEEDED
Status: DISCONTINUED | OUTPATIENT
Start: 2024-12-05 | End: 2024-12-05

## 2024-12-05 RX ORDER — LIDOCAINE HYDROCHLORIDE 10 MG/ML
0.1 INJECTION, SOLUTION INFILTRATION; PERINEURAL ONCE
Status: CANCELLED | OUTPATIENT
Start: 2024-12-05 | End: 2024-12-05

## 2024-12-05 RX ORDER — CEFAZOLIN SODIUM 2 G/100ML
2 INJECTION, SOLUTION INTRAVENOUS EVERY 8 HOURS
Status: COMPLETED | OUTPATIENT
Start: 2024-12-06 | End: 2024-12-06

## 2024-12-05 RX ORDER — DIPHENHYDRAMINE HCL 12.5MG/5ML
12.5 LIQUID (ML) ORAL EVERY 6 HOURS PRN
Status: DISCONTINUED | OUTPATIENT
Start: 2024-12-05 | End: 2024-12-06 | Stop reason: HOSPADM

## 2024-12-05 RX ORDER — PROCHLORPERAZINE MALEATE 10 MG
10 TABLET ORAL EVERY 6 HOURS PRN
Status: DISCONTINUED | OUTPATIENT
Start: 2024-12-05 | End: 2024-12-06 | Stop reason: HOSPADM

## 2024-12-05 RX ORDER — SODIUM CHLORIDE, SODIUM LACTATE, POTASSIUM CHLORIDE, CALCIUM CHLORIDE 600; 310; 30; 20 MG/100ML; MG/100ML; MG/100ML; MG/100ML
50 INJECTION, SOLUTION INTRAVENOUS CONTINUOUS
Status: ACTIVE | OUTPATIENT
Start: 2024-12-05 | End: 2024-12-06

## 2024-12-05 RX ORDER — ONDANSETRON 4 MG/1
4 TABLET, FILM COATED ORAL EVERY 8 HOURS PRN
Status: DISCONTINUED | OUTPATIENT
Start: 2024-12-05 | End: 2024-12-06 | Stop reason: HOSPADM

## 2024-12-05 RX ORDER — ALBUTEROL SULFATE 0.83 MG/ML
2.5 SOLUTION RESPIRATORY (INHALATION) ONCE AS NEEDED
Status: DISCONTINUED | OUTPATIENT
Start: 2024-12-05 | End: 2024-12-05 | Stop reason: HOSPADM

## 2024-12-05 RX ORDER — HYDROMORPHONE HYDROCHLORIDE 0.2 MG/ML
0.2 INJECTION INTRAMUSCULAR; INTRAVENOUS; SUBCUTANEOUS EVERY 5 MIN PRN
Status: DISCONTINUED | OUTPATIENT
Start: 2024-12-05 | End: 2024-12-05 | Stop reason: HOSPADM

## 2024-12-05 RX ORDER — HYDRALAZINE HYDROCHLORIDE 20 MG/ML
5 INJECTION INTRAMUSCULAR; INTRAVENOUS EVERY 30 MIN PRN
Status: DISCONTINUED | OUTPATIENT
Start: 2024-12-05 | End: 2024-12-05 | Stop reason: HOSPADM

## 2024-12-05 RX ORDER — ENOXAPARIN SODIUM 100 MG/ML
30 INJECTION SUBCUTANEOUS
Status: DISCONTINUED | OUTPATIENT
Start: 2024-12-05 | End: 2024-12-06 | Stop reason: HOSPADM

## 2024-12-05 RX ORDER — OXYCODONE AND ACETAMINOPHEN 5; 325 MG/1; MG/1
1 TABLET ORAL EVERY 4 HOURS PRN
Status: DISCONTINUED | OUTPATIENT
Start: 2024-12-05 | End: 2024-12-05 | Stop reason: HOSPADM

## 2024-12-05 SDOH — ECONOMIC STABILITY: HOUSING INSECURITY: IN THE LAST 12 MONTHS, WAS THERE A TIME WHEN YOU WERE NOT ABLE TO PAY THE MORTGAGE OR RENT ON TIME?: NO

## 2024-12-05 SDOH — SOCIAL STABILITY: SOCIAL NETWORK
DO YOU BELONG TO ANY CLUBS OR ORGANIZATIONS SUCH AS CHURCH GROUPS, UNIONS, FRATERNAL OR ATHLETIC GROUPS, OR SCHOOL GROUPS?: YES

## 2024-12-05 SDOH — HEALTH STABILITY: PHYSICAL HEALTH: ON AVERAGE, HOW MANY DAYS PER WEEK DO YOU ENGAGE IN MODERATE TO STRENUOUS EXERCISE (LIKE A BRISK WALK)?: 0 DAYS

## 2024-12-05 SDOH — SOCIAL STABILITY: SOCIAL INSECURITY: DO YOU FEEL ANYONE HAS EXPLOITED OR TAKEN ADVANTAGE OF YOU FINANCIALLY OR OF YOUR PERSONAL PROPERTY?: NO

## 2024-12-05 SDOH — ECONOMIC STABILITY: INCOME INSECURITY: IN THE PAST 12 MONTHS HAS THE ELECTRIC, GAS, OIL, OR WATER COMPANY THREATENED TO SHUT OFF SERVICES IN YOUR HOME?: NO

## 2024-12-05 SDOH — SOCIAL STABILITY: SOCIAL INSECURITY: WITHIN THE LAST YEAR, HAVE YOU BEEN AFRAID OF YOUR PARTNER OR EX-PARTNER?: NO

## 2024-12-05 SDOH — SOCIAL STABILITY: SOCIAL INSECURITY: DOES ANYONE TRY TO KEEP YOU FROM HAVING/CONTACTING OTHER FRIENDS OR DOING THINGS OUTSIDE YOUR HOME?: NO

## 2024-12-05 SDOH — ECONOMIC STABILITY: FOOD INSECURITY: WITHIN THE PAST 12 MONTHS, THE FOOD YOU BOUGHT JUST DIDN'T LAST AND YOU DIDN'T HAVE MONEY TO GET MORE.: NEVER TRUE

## 2024-12-05 SDOH — HEALTH STABILITY: MENTAL HEALTH: CURRENT SMOKER: 0

## 2024-12-05 SDOH — SOCIAL STABILITY: SOCIAL INSECURITY: HAVE YOU HAD ANY THOUGHTS OF HARMING ANYONE ELSE?: NO

## 2024-12-05 SDOH — ECONOMIC STABILITY: TRANSPORTATION INSECURITY: IN THE PAST 12 MONTHS, HAS LACK OF TRANSPORTATION KEPT YOU FROM MEDICAL APPOINTMENTS OR FROM GETTING MEDICATIONS?: NO

## 2024-12-05 SDOH — SOCIAL STABILITY: SOCIAL NETWORK
IN A TYPICAL WEEK, HOW MANY TIMES DO YOU TALK ON THE PHONE WITH FAMILY, FRIENDS, OR NEIGHBORS?: MORE THAN THREE TIMES A WEEK

## 2024-12-05 SDOH — SOCIAL STABILITY: SOCIAL INSECURITY: ARE YOU OR HAVE YOU BEEN THREATENED OR ABUSED PHYSICALLY, EMOTIONALLY, OR SEXUALLY BY ANYONE?: NO

## 2024-12-05 SDOH — SOCIAL STABILITY: SOCIAL INSECURITY: WITHIN THE LAST YEAR, HAVE YOU BEEN HUMILIATED OR EMOTIONALLY ABUSED IN OTHER WAYS BY YOUR PARTNER OR EX-PARTNER?: NO

## 2024-12-05 SDOH — SOCIAL STABILITY: SOCIAL NETWORK: HOW OFTEN DO YOU ATTEND CHURCH OR RELIGIOUS SERVICES?: MORE THAN 4 TIMES PER YEAR

## 2024-12-05 SDOH — SOCIAL STABILITY: SOCIAL INSECURITY: ABUSE: ADULT

## 2024-12-05 SDOH — SOCIAL STABILITY: SOCIAL INSECURITY
WITHIN THE LAST YEAR, HAVE YOU BEEN RAPED OR FORCED TO HAVE ANY KIND OF SEXUAL ACTIVITY BY YOUR PARTNER OR EX-PARTNER?: NO

## 2024-12-05 SDOH — SOCIAL STABILITY: SOCIAL INSECURITY: ARE YOU MARRIED, WIDOWED, DIVORCED, SEPARATED, NEVER MARRIED, OR LIVING WITH A PARTNER?: WIDOWED

## 2024-12-05 SDOH — SOCIAL STABILITY: SOCIAL INSECURITY: DO YOU FEEL UNSAFE GOING BACK TO THE PLACE WHERE YOU ARE LIVING?: NO

## 2024-12-05 SDOH — SOCIAL STABILITY: SOCIAL INSECURITY: HAVE YOU HAD THOUGHTS OF HARMING ANYONE ELSE?: NO

## 2024-12-05 SDOH — ECONOMIC STABILITY: FOOD INSECURITY: WITHIN THE PAST 12 MONTHS, YOU WORRIED THAT YOUR FOOD WOULD RUN OUT BEFORE YOU GOT THE MONEY TO BUY MORE.: NEVER TRUE

## 2024-12-05 SDOH — HEALTH STABILITY: PHYSICAL HEALTH
HOW OFTEN DO YOU NEED TO HAVE SOMEONE HELP YOU WHEN YOU READ INSTRUCTIONS, PAMPHLETS, OR OTHER WRITTEN MATERIAL FROM YOUR DOCTOR OR PHARMACY?: NEVER

## 2024-12-05 SDOH — ECONOMIC STABILITY: HOUSING INSECURITY: AT ANY TIME IN THE PAST 12 MONTHS, WERE YOU HOMELESS OR LIVING IN A SHELTER (INCLUDING NOW)?: NO

## 2024-12-05 SDOH — ECONOMIC STABILITY: FOOD INSECURITY: HOW HARD IS IT FOR YOU TO PAY FOR THE VERY BASICS LIKE FOOD, HOUSING, MEDICAL CARE, AND HEATING?: NOT HARD AT ALL

## 2024-12-05 SDOH — SOCIAL STABILITY: SOCIAL INSECURITY
WITHIN THE LAST YEAR, HAVE YOU BEEN KICKED, HIT, SLAPPED, OR OTHERWISE PHYSICALLY HURT BY YOUR PARTNER OR EX-PARTNER?: NO

## 2024-12-05 SDOH — SOCIAL STABILITY: SOCIAL INSECURITY: WERE YOU ABLE TO COMPLETE ALL THE BEHAVIORAL HEALTH SCREENINGS?: YES

## 2024-12-05 SDOH — SOCIAL STABILITY: SOCIAL INSECURITY: HAS ANYONE EVER THREATENED TO HURT YOUR FAMILY OR YOUR PETS?: NO

## 2024-12-05 SDOH — HEALTH STABILITY: PHYSICAL HEALTH: ON AVERAGE, HOW MANY MINUTES DO YOU ENGAGE IN EXERCISE AT THIS LEVEL?: 0 MIN

## 2024-12-05 SDOH — SOCIAL STABILITY: SOCIAL INSECURITY: ARE THERE ANY APPARENT SIGNS OF INJURIES/BEHAVIORS THAT COULD BE RELATED TO ABUSE/NEGLECT?: NO

## 2024-12-05 SDOH — SOCIAL STABILITY: SOCIAL NETWORK: HOW OFTEN DO YOU ATTEND MEETINGS OF THE CLUBS OR ORGANIZATIONS YOU BELONG TO?: MORE THAN 4 TIMES PER YEAR

## 2024-12-05 SDOH — ECONOMIC STABILITY: HOUSING INSECURITY: IN THE PAST 12 MONTHS, HOW MANY TIMES HAVE YOU MOVED WHERE YOU WERE LIVING?: 0

## 2024-12-05 ASSESSMENT — PAIN - FUNCTIONAL ASSESSMENT
PAIN_FUNCTIONAL_ASSESSMENT: 0-10
PAIN_FUNCTIONAL_ASSESSMENT: UNABLE TO SELF-REPORT
PAIN_FUNCTIONAL_ASSESSMENT: 0-10

## 2024-12-05 ASSESSMENT — COGNITIVE AND FUNCTIONAL STATUS - GENERAL
MOBILITY SCORE: 9
WALKING IN HOSPITAL ROOM: A LOT
STANDING UP FROM CHAIR USING ARMS: A LOT
MOVING TO AND FROM BED TO CHAIR: A LOT
PERSONAL GROOMING: A LOT
TOILETING: A LOT
DRESSING REGULAR UPPER BODY CLOTHING: A LITTLE
HELP NEEDED FOR BATHING: A LITTLE
DRESSING REGULAR UPPER BODY CLOTHING: A LITTLE
DRESSING REGULAR LOWER BODY CLOTHING: A LOT
HELP NEEDED FOR BATHING: A LITTLE
PATIENT BASELINE BEDBOUND: NO
STANDING UP FROM CHAIR USING ARMS: TOTAL
WALKING IN HOSPITAL ROOM: TOTAL
MOVING TO AND FROM BED TO CHAIR: A LOT
TURNING FROM BACK TO SIDE WHILE IN FLAT BAD: A LOT
MOVING FROM LYING ON BACK TO SITTING ON SIDE OF FLAT BED WITH BEDRAILS: A LOT
MOVING FROM LYING ON BACK TO SITTING ON SIDE OF FLAT BED WITH BEDRAILS: A LITTLE
CLIMB 3 TO 5 STEPS WITH RAILING: A LOT
TURNING FROM BACK TO SIDE WHILE IN FLAT BAD: A LITTLE
TOILETING: A LOT
DAILY ACTIVITIY SCORE: 16
MOBILITY SCORE: 14
DAILY ACTIVITIY SCORE: 19
PERSONAL GROOMING: A LITTLE
CLIMB 3 TO 5 STEPS WITH RAILING: TOTAL

## 2024-12-05 ASSESSMENT — ACTIVITIES OF DAILY LIVING (ADL)
BATHING: NEEDS ASSISTANCE
LACK_OF_TRANSPORTATION: NO
PATIENT'S MEMORY ADEQUATE TO SAFELY COMPLETE DAILY ACTIVITIES?: YES
HEARING - RIGHT EAR: HEARING AID
GROOMING: NEEDS ASSISTANCE
LACK_OF_TRANSPORTATION: NO
TOILETING: DEPENDENT
LACK_OF_TRANSPORTATION: NO
WALKS IN HOME: NEEDS ASSISTANCE
ADEQUATE_TO_COMPLETE_ADL: YES
DRESSING YOURSELF: NEEDS ASSISTANCE
FEEDING YOURSELF: NEEDS ASSISTANCE
HEARING - LEFT EAR: HEARING AID
JUDGMENT_ADEQUATE_SAFELY_COMPLETE_DAILY_ACTIVITIES: YES

## 2024-12-05 ASSESSMENT — PATIENT HEALTH QUESTIONNAIRE - PHQ9
SUM OF ALL RESPONSES TO PHQ9 QUESTIONS 1 & 2: 0
1. LITTLE INTEREST OR PLEASURE IN DOING THINGS: NOT AT ALL
2. FEELING DOWN, DEPRESSED OR HOPELESS: NOT AT ALL

## 2024-12-05 ASSESSMENT — LIFESTYLE VARIABLES
AUDIT-C TOTAL SCORE: 0
AUDIT-C TOTAL SCORE: 0
HOW MANY STANDARD DRINKS CONTAINING ALCOHOL DO YOU HAVE ON A TYPICAL DAY: PATIENT DOES NOT DRINK
HOW OFTEN DO YOU HAVE A DRINK CONTAINING ALCOHOL: NEVER
HOW OFTEN DO YOU HAVE 6 OR MORE DRINKS ON ONE OCCASION: NEVER
SKIP TO QUESTIONS 9-10: 1

## 2024-12-05 ASSESSMENT — PAIN SCALES - GENERAL
PAINLEVEL_OUTOF10: 1
PAINLEVEL_OUTOF10: 4
PAINLEVEL_OUTOF10: 0 - NO PAIN
PAINLEVEL_OUTOF10: 0 - NO PAIN
PAINLEVEL_OUTOF10: 6
PAINLEVEL_OUTOF10: 0 - NO PAIN
PAINLEVEL_OUTOF10: 6
PAINLEVEL_OUTOF10: 10 - WORST POSSIBLE PAIN
PAINLEVEL_OUTOF10: 3

## 2024-12-05 ASSESSMENT — ENCOUNTER SYMPTOMS
SYNCOPE: 0
PSYCHIATRIC NEGATIVE: 1
IRREGULAR HEARTBEAT: 0
LIGHT-HEADEDNESS: 0
DIZZINESS: 0
SHORTNESS OF BREATH: 0
PALPITATIONS: 0
NEAR-SYNCOPE: 0
EYES NEGATIVE: 1
CONSTITUTIONAL NEGATIVE: 1
WEAKNESS: 0
ENDOCRINE NEGATIVE: 1
DYSPNEA ON EXERTION: 0
GASTROINTESTINAL NEGATIVE: 1

## 2024-12-05 ASSESSMENT — PAIN SCALES - PAIN ASSESSMENT IN ADVANCED DEMENTIA (PAINAD): TOTALSCORE: MEDICATION (SEE MAR)

## 2024-12-05 NOTE — CONSULTS
Chief complaint: Right hip pain    History of present illness: Patient fell.  She has isolated pain to the right hip and thigh.  No pain anywhere else.  She has a history of knee arthritis that is a little achy and sore but no change in that.  No left-sided pain.  No head pain neck pain or loss of consciousness.  No history of right hip surgery.  Movement makes her pain worse.  Rest makes it better.    Past medical history:  Past Medical History:   Diagnosis Date    Cellulitis of left upper limb 07/23/2021    Cellulitis of hand, left    Cellulitis of unspecified toe 07/01/2016    Cellulitis, toe    Encounter for follow-up examination after completed treatment for conditions other than malignant neoplasm 09/23/2020    Status post vascular surgery, follow-up exam    Hyperlipidemia, unspecified     Dyslipidemia    Laceration without foreign body, left lower leg, initial encounter 05/12/2016    Laceration of leg, left    Occlusion and stenosis of left carotid artery 04/06/2021    Occlusion and stenosis of left carotid artery    Ocular hypertension, unspecified eye     Borderline glaucoma with ocular hypertension    Open bite of unspecified hand, initial encounter 07/23/2021    Cat bite of hand, initial encounter    Open bite, left lower leg, sequela 07/29/2016    Dog bite of calf, left, sequela    Other conditions influencing health status     Chlamydial Pneumonia    Other conditions influencing health status     Acute Myocardial Infarction    Other conditions influencing health status     Coronary Artery Disease    Other conditions influencing health status     X-Ray    Other conditions influencing health status     X-Ray    Other conditions influencing health status     Screening for malignant neoplasms, colon    Other conditions influencing health status 01/27/2017    History of cough    Pain in right knee 08/30/2016    Right knee pain    Personal history of diseases of the skin and subcutaneous tissue     History  of rosacea    Personal history of other diseases of the circulatory system     History of congestive heart disease    Personal history of other diseases of the circulatory system     History of hypertension    Personal history of other diseases of the musculoskeletal system and connective tissue     History of fibromyositis    Personal history of other diseases of the nervous system and sense organs     History of migraine headaches    Personal history of other diseases of the nervous system and sense organs     History of sleep apnea    Personal history of other endocrine, nutritional and metabolic disease     History of hypothyroidism    Personal history of other endocrine, nutritional and metabolic disease     History of obesity    Personal history of other medical treatment 2021    History of screening mammography    Personal history of other specified conditions 2013    History of abnormal mammogram    Personal history of other specified conditions 10/12/2021    History of headache    Personal history of other specified conditions 10/12/2021    History of nausea and vomiting    Personal history of other specified conditions 10/19/2021    History of chest pain    Restless legs syndrome     Restless legs syndrome    Sacrococcygeal disorders, not elsewhere classified 2016    Tail bone pain       Social history:   Social History     Occupational History    Not on file   Tobacco Use    Smoking status: Former     Current packs/day: 0.00     Average packs/day: 0.3 packs/day for 10.0 years (2.5 ttl pk-yrs)     Types: Cigarettes     Start date: 1968     Quit date: 1978     Years since quittin.6    Smokeless tobacco: Never    Tobacco comments:     quit on 28 birthday   Substance and Sexual Activity    Alcohol use: Never    Drug use: Never    Sexual activity: Not Currently     Partners: Male     Birth control/protection: Female Sterilization        Family history:   Family History    Problem Relation Name Age of Onset    Hearing loss Father Teodoro     Stroke Father Teodoro     Anesthesia related problems Brother Ant     Hypertension Brother Ant     Breast cancer Mother's Sister Aunt Louisa     Diabetes Father's Sister Sr Villanueva         Physical exam: Right hip is shortened externally rotated.  Cannot check range of motion strength stability the hip due to pain.  The right ankle and foot have good range of motion strength stability nontender.  She is nontender in the right knee.  Comparison examination of the contralateral side is normal with regards to palpation, range of motion, strength and stability.  Well-nourished, well kept.Good perfusion to the lower extremities bilaterally.  Dorsalis pedis pulses 2+.  Capillary refill to the digits brisk.  No distal edema.No lymphangitis or lymphadenopathy in the examined extremities.  No redness, abrasions, or lesions on the lower extremities bilaterally.  Gross sensation intact to the lower extremity is bilaterally.  Affect normal.      Imaging/special tests: X-rays show a low femoral neck high intertrochanteric fracture with displacement of the right hip.    Assessment: Patient with right intertrochanteric hip fracture.  I had a long discussion with the patient as well as her friend who was on the phone who is a nurse Quispe.  I had a long discussion with the patient and explained to them that their options are not have surgery and lay in bed and not able to ambulate likely for the rest of her life or proceed with surgery.  All of the risks benefits and potential complications of operative versus nonoperative treatment were discussed with the patient.  Operative risks discussed included but were not limited to anesthesia complications, infections, excessive bleeding, damaged to uninjured healthy structures, and failure of surgery requiring the need for reoperation resulting in chronic pain and disability.  The patient completely understands those  risks and wished to proceed with operative intervention.  This patient is severely inhibited with bodily function.    Plan: We are going to perform a right TFN once cleared medically and once the operating room has time for us to proceed.    Thank you very much for this consultation.  Please do not hesitate to contact me with any questions or concerns.    Humphrey Holbrook M.D.      This dictation was created using voice recognition software.  If there are any questions about inaccuracies please do not hesitate to contact me.

## 2024-12-05 NOTE — PROGRESS NOTES
12/05/24 1149   Discharge Planning   Living Arrangements Alone   Support Systems Children   Assistance Needed 4 pronged walker   Type of Residence Private residence   Do you have animals or pets at home? Yes   Type of Animals or Pets 1 cat. Cat is being boarded   Who is requesting discharge planning? Provider   Home or Post Acute Services None   Expected Discharge Disposition SNF   Does the patient need discharge transport arranged? Yes  (reviewed potential cost with daughter)   RoundTrip coordination needed? Yes   Has discharge transport been arranged? No   What day is the transport expected? 12/07/24   Housing Stability   In the last 12 months, was there a time when you were not able to pay the mortgage or rent on time? N   In the past 12 months, how many times have you moved where you were living? 0   At any time in the past 12 months, were you homeless or living in a shelter (including now)? N   Patient Choice   Provider Choice list and CMS website (https://medicare.gov/care-compare#search) for post-acute Quality and Resource Measure Data were provided and reviewed with: Patient;Family   Stroke Family Assessment   Stroke Family Assessment Needed No   Intensity of Service   Intensity of Service 0-30 min     Introduced myself and my role. Patient is [facility] and permission given to call her daughter Bety and ask her any questions. States plan is to go to the [facility] from the hospital.  Explained 3 night stay. Called daughter Bety.  Will e gary SNF choice list incase [facility] has not beds. Surgery planned for today 4 pm. [facility] notified to send referral. ADOD 12/7/24

## 2024-12-05 NOTE — ANESTHESIA PROCEDURE NOTES
Airway  Date/Time: 12/5/2024 5:03 PM  Urgency: elective    Airway not difficult    Staffing  Performed: CRNA   Authorized by: Cleve Schofield MD    Performed by: SAMIR Sanches-BARRY  Patient location during procedure: OR    Indications and Patient Condition  Indications for airway management: anesthesia and airway protection  Spontaneous ventilation: present  Sedation level: deep  Preoxygenated: yes  Patient position: sniffing  MILS maintained throughout  Mask difficulty assessment: 1 - vent by mask  No planned trial extubation    Final Airway Details  Final airway type: endotracheal airway      Successful airway: ETT     Successful intubation technique: video laryngoscopy  Facilitating devices/methods: intubating stylet  Endotracheal tube insertion site: oral  Blade: Francois  Blade size: #4  ETT size (mm): 7.0  Placement verified by: chest auscultation and capnometry   Measured from: lips  Number of attempts at approach: 1  Ventilation between attempts: none  Number of other approaches attempted: 0

## 2024-12-05 NOTE — OP NOTE
Preoperative diagnosis: Right intertrochanteric hip fracture    Postop diagnosis: Same    Procedure: Right hip closed reduction of an intertrochanteric hip fracture and placement of an intramedullary nail    Surgeon: Humphrey Holbrook M.D.    Asst.: Dominic BROWNThe physician assistant was present through the entire case.  Given the nature of the disease process and the procedure to be performed a skilled surgical assistant was necessary during the case.  The assistant was necessary in order to hold retractors and directly assist in the operation.  A certified scrub tech was at the back table managing instruments and supplies for the surgical case.    Anesthesia: Gen.    Blood Loss: 200 cc    Complications: None    HPI: Patient fell and sustained an intertrochanteric hip fracture.  The patient was scheduled electively for a  TFN.  All of the risks, benefits, and potential complications for operative and nonoperative treatment were discussed at length with the patient.  Risks of operative intervention include, but are not limited to: Anesthesia complications, extensive blood loss, infection, damaged uninjured structures, blood clots, and lack of improvement or worsening of symptoms.  These complications could result in death, permanent disability, or need for reoperation.  The patient completely understood these risks and wished to proceed with operative intervention.    Operative implants: Synthes trochanteric femoral nail A was used.    Operative procedure: The patient was wheeled from the preanesthesia care unit to the theater.  The patient was placed in supine position and all bony prominences were well-padded.  For the entire procedure anesthesia maintainined control of the patient's head neck.  General anesthesia was induced.  The patient was placed on the fracture table.  The fractured leg was placed in the fracture leg garay.  The contralateral leg was placed in a flexed flexed position and well-padded.   Fracture table was then used to reduce the fracture.  AP lateral and fluoroscopic x-rays were taken to confirm appropriate reduction.  At this point appropriate fracture reduction was obtained.  The operative hip was then prepped and draped in normal sterile fashion.    Timeout was performed, site verification marking was verified, and appropriate antibiotic and stretch was confirmed.  An incision proximal to the tip of the greater trochanter was performed.  This was taken down through skin and fascia with a knife.  Next a guidewire was placed on the tip the greater trochanter.  The guidewire was inserted, with power, centered into the femur visualized in AP and lateral projections.  Next, the entry reamer was used to ream down to the level of the lesser trochanter.  The guidewire and reamer were removed and the nail was inserted.  The nail was brought down to the appropriate level visualized on x-ray.  AP lateral x-rays confirmed appropriate positioning of the nail.  Using the outrigger arm in the triple yellow guide an incision in the lateral aspect of the thigh through the skin and fascia was performed.  Next a guidewire was inserted centered into the femoral head visualized in both AP and lateral projections.  Length of the guidewire was measured and the lateral cortex was reamed.  Appropriate length helical blade was tamped up to into position and locked from the top and backed off one half turn.  The triple yellow guide was removed and the triple green guide was placed through the outrigger arm.  A lateral incision through the skin and fascia was performed and the drill guide was placed down on bone visualized on x-ray.  Bicortical drilling was performed.  Length of the screw was determined off of the drill bit.  And then the screw was placed until it was seated firmly on the lateral cortex of the femur.    The outrigger arm was removed.  Final AP lateral and fluoroscopic x-rays of the entire construct were  taken to confirm appropriate nail positioning, helical blade positioning and length, and screw positioning and length.  All wounds were irrigated with copious amounts of normal saline the deep fascia was closed with 0 Vicryl.  The fatty layer was closed with 0 Vicryl.  The skin was closed with 2-0 Vicryl and staples.  Sterile dressings were applied.  At the  conclusion of  the case the patient's toes were pink and warm with brisk cap refill.  The patient tolerated the procedure well.          This dictation was created using voice recognition software.  If there are any questions about inaccuracies please do not hesitate to contact me.

## 2024-12-05 NOTE — ANESTHESIA PREPROCEDURE EVALUATION
Patient: Maegan Shepherd    Procedure Information       Date/Time: 12/05/24 1600    Procedure: Hip Fracture ORIF w/ Nail Trochanteric (Right: Hip)    Location: [facility] OR [facility] / [facility]    Surgeons: Humphrey Holbrook MD            Relevant Problems   Cardiac   (+) Abnormal EKG   (+) Benign essential hypertension   (+) Coronary atherosclerosis   (+) Defect, congenital heart   (+) Hyperlipidemia   (+) Mixed hyperlipidemia   (+) Reactive hypertension   (+) Sinus bradycardia      Neuro   (+) Anxiety   (+) Carotid artery disease (CMS-HCC)   (+) Carotid stenosis   (+) Depression   (+) Generalized anxiety disorder   (+) Idiopathic peripheral neuropathy      GI   (+) Gastroesophageal reflux disease      Endocrine   (+) Multiple thyroid nodules      Musculoskeletal   (+) Fibromyalgia   (+) Localized, primary osteoarthritis   (+) Osteoarthritis of knee   (+) Osteoarthrosis      HEENT   (+) Glaucoma      Skin   (+) Basal cell carcinoma (BCC)       Clinical information reviewed:   Tobacco  Allergies  Meds  Problems  Med Hx  Surg Hx   Fam Hx  Soc   Hx        NPO Detail:  No data recorded     Physical Exam    Airway  Mallampati: II  TM distance: >3 FB     Cardiovascular   Rhythm: regular  Rate: normal     Dental - normal exam     Pulmonary   Breath sounds clear to auscultation     Abdominal            Anesthesia Plan    History of general anesthesia?: yes  History of complications of general anesthesia?: no    ASA 3     general     The patient is not a current smoker.    intravenous induction   Postoperative administration of opioids is intended.  Anesthetic plan and risks discussed with patient.    Plan discussed with CRNA.

## 2024-12-05 NOTE — PROGRESS NOTES
Maegan Shepherd is a 74 y.o. female on day 1 of admission presenting with Closed 2-part intertrochanteric fracture of proximal end of femur with delayed healing, right.    Subjective   Patient seen and examined at bedside this morning, no acute event overnight.  Still has right hip pain 10/10 going to the thigh.  Patient denied chest pain,shortness of breath, abdominal pain, change in bowel or urinary habits.  No new complaint    Objective     Last Recorded Vitals  /61 (BP Location: Right arm, Patient Position: Lying)   Pulse 56   Temp 36.9 °C (98.4 °F) (Temporal)   Resp 16   Wt 79.9 kg (176 lb 1.6 oz)   SpO2 96%   Intake/Output last 3 Shifts:    Intake/Output Summary (Last 24 hours) at 12/5/2024 1230  Last data filed at 12/5/2024 0629  Gross per 24 hour   Intake --   Output 550 ml   Net -550 ml       Admission Weight  Weight: 75.8 kg (167 lb) (12/04/24 1520)    Daily Weight  12/04/24 : 79.9 kg (176 lb 1.6 oz)    Image Results  ECG 12 Lead  Sinus bradycardia  Possible Left atrial enlargement  Nonspecific ST abnormality  Abnormal ECG  When compared with ECG of 11-SEP-2020 03:39,  No significant change was found      Physical Exam  Constitutional:       Appearance: Normal appearance.   HENT:      Head: Normocephalic and atraumatic.      Mouth/Throat:      Mouth: Mucous membranes are moist.   Cardiovascular:      Heart sounds: No murmur heard.     No gallop.   Pulmonary:      Effort: No respiratory distress.      Breath sounds: Normal breath sounds. No wheezing.   Abdominal:      General: There is no distension.      Tenderness: There is no abdominal tenderness.   Musculoskeletal:      Right lower leg: No edema.      Left lower leg: No edema.      Comments: Tenderness in the right hip joint   Skin:     General: Skin is warm.   Neurological:      Mental Status: She is alert and oriented to person, place, and time.      Cranial Nerves: No cranial nerve deficit.      Motor: No weakness.       Relevant  Results  Scheduled medications  [Held by provider] aspirin, 81 mg, oral, Daily  carvedilol, 6.25 mg, oral, BID  ferrous sulfate, 150 mg, oral, Daily with breakfast  [Held by provider] heparin (porcine), 5,000 Units, subcutaneous, q8h  PARoxetine, 10 mg, oral, Daily  polyethylene glycol, 17 g, oral, Daily  [START ON 12/6/2024] rosuvastatin, 20 mg, oral, Every other day  valsartan, 160 mg, oral, Daily      Continuous medications  sodium chloride 0.9%, 75 mL/hr, Last Rate: 75 mL/hr (12/05/24 1012)      PRN medications  PRN medications: acetaminophen, HYDROmorphone, HYDROmorphone, ondansetron ODT **OR** ondansetron, oxyCODONE  Results for orders placed or performed during the hospital encounter of 12/04/24 (from the past 24 hours)   CBC and Auto Differential   Result Value Ref Range    WBC 18.0 (H) 4.4 - 11.3 x10*3/uL    nRBC 0.0 0.0 - 0.0 /100 WBCs    RBC 5.14 4.00 - 5.20 x10*6/uL    Hemoglobin 15.6 12.0 - 16.0 g/dL    Hematocrit 47.5 (H) 36.0 - 46.0 %    MCV 92 80 - 100 fL    MCH 30.4 26.0 - 34.0 pg    MCHC 32.8 32.0 - 36.0 g/dL    RDW 12.6 11.5 - 14.5 %    Platelets 246 150 - 450 x10*3/uL    Neutrophils % 88.4 40.0 - 80.0 %    Immature Granulocytes %, Automated 0.5 0.0 - 0.9 %    Lymphocytes % 7.4 13.0 - 44.0 %    Monocytes % 2.9 2.0 - 10.0 %    Eosinophils % 0.4 0.0 - 6.0 %    Basophils % 0.4 0.0 - 2.0 %    Neutrophils Absolute 15.94 (H) 1.60 - 5.50 x10*3/uL    Immature Granulocytes Absolute, Automated 0.09 0.00 - 0.50 x10*3/uL    Lymphocytes Absolute 1.33 0.80 - 3.00 x10*3/uL    Monocytes Absolute 0.53 0.05 - 0.80 x10*3/uL    Eosinophils Absolute 0.07 0.00 - 0.40 x10*3/uL    Basophils Absolute 0.07 0.00 - 0.10 x10*3/uL   Comprehensive Metabolic Panel   Result Value Ref Range    Glucose 135 (H) 74 - 99 mg/dL    Sodium 139 136 - 145 mmol/L    Potassium 3.8 3.5 - 5.3 mmol/L    Chloride 102 98 - 107 mmol/L    Bicarbonate 25 21 - 32 mmol/L    Anion Gap 16 10 - 20 mmol/L    Urea Nitrogen 22 6 - 23 mg/dL    Creatinine  0.94 0.50 - 1.05 mg/dL    eGFR 63 >60 mL/min/1.73m*2    Calcium 10.1 8.6 - 10.3 mg/dL    Albumin 4.5 3.4 - 5.0 g/dL    Alkaline Phosphatase 87 33 - 136 U/L    Total Protein 7.3 6.4 - 8.2 g/dL    AST 35 9 - 39 U/L    Bilirubin, Total 1.0 0.0 - 1.2 mg/dL    ALT 41 7 - 45 U/L   Troponin I, High Sensitivity   Result Value Ref Range    Troponin I, High Sensitivity 33 (H) 0 - 13 ng/L   Acute Toxicology Panel, Blood   Result Value Ref Range    Acetaminophen <10.0 10.0 - 30.0 ug/mL    Salicylate  <3 4 - 20 mg/dL    Alcohol <10 <=10 mg/dL   Protime-INR   Result Value Ref Range    Protime 12.3 9.8 - 12.8 seconds    INR 1.1 0.9 - 1.1   Type And Screen   Result Value Ref Range    ABO TYPE B     Rh TYPE NEG     ANTIBODY SCREEN NEG    Creatine Kinase   Result Value Ref Range    Creatine Kinase 87 0 - 215 U/L   ECG 12 Lead   Result Value Ref Range    Ventricular Rate 52 BPM    Atrial Rate 52 BPM    AZ Interval 142 ms    QRS Duration 94 ms    QT Interval 426 ms    QTC Calculation(Bazett) 396 ms    P Axis 56 degrees    R Axis 64 degrees    T Axis 32 degrees    QRS Count 9 beats    Q Onset 218 ms    P Onset 147 ms    P Offset 209 ms    T Offset 431 ms    QTC Fredericia 406 ms   Troponin I, High Sensitivity   Result Value Ref Range    Troponin I, High Sensitivity 89 (HH) 0 - 13 ng/L   Urinalysis with Reflex Culture and Microscopic   Result Value Ref Range    Color, Urine Light-Yellow Light-Yellow, Yellow, Dark-Yellow    Appearance, Urine Clear Clear    Specific Gravity, Urine >1.050 (N) 1.005 - 1.035    pH, Urine 8.0 5.0, 5.5, 6.0, 6.5, 7.0, 7.5, 8.0    Protein, Urine 30 (1+) (A) NEGATIVE, 10 (TRACE), 20 (TRACE) mg/dL    Glucose, Urine Normal Normal mg/dL    Blood, Urine NEGATIVE NEGATIVE    Ketones, Urine 40 (2+) (A) NEGATIVE mg/dL    Bilirubin, Urine NEGATIVE NEGATIVE    Urobilinogen, Urine Normal Normal mg/dL    Nitrite, Urine NEGATIVE NEGATIVE    Leukocyte Esterase, Urine NEGATIVE NEGATIVE   Extra Urine Gray Tube   Result Value  Ref Range    Extra Tube Hold for add-ons.    Urinalysis Microscopic   Result Value Ref Range    WBC, Urine 1-5 1-5, NONE /HPF    RBC, Urine 6-10 (A) NONE, 1-2, 3-5 /HPF    Squamous Epithelial Cells, Urine 1-9 (SPARSE) Reference range not established. /HPF   Troponin I, High Sensitivity   Result Value Ref Range    Troponin I, High Sensitivity 117 (HH) 0 - 13 ng/L   Renal Function Panel   Result Value Ref Range    Glucose 128 (H) 74 - 99 mg/dL    Sodium 138 136 - 145 mmol/L    Potassium 4.1 3.5 - 5.3 mmol/L    Chloride 103 98 - 107 mmol/L    Bicarbonate 25 21 - 32 mmol/L    Anion Gap 14 10 - 20 mmol/L    Urea Nitrogen 25 (H) 6 - 23 mg/dL    Creatinine 0.98 0.50 - 1.05 mg/dL    eGFR 60 (L) >60 mL/min/1.73m*2    Calcium 9.6 8.6 - 10.3 mg/dL    Phosphorus 4.3 2.5 - 4.9 mg/dL    Albumin 4.2 3.4 - 5.0 g/dL   Magnesium   Result Value Ref Range    Magnesium 2.34 1.60 - 2.40 mg/dL   CBC   Result Value Ref Range    WBC 12.8 (H) 4.4 - 11.3 x10*3/uL    nRBC 0.0 0.0 - 0.0 /100 WBCs    RBC 4.89 4.00 - 5.20 x10*6/uL    Hemoglobin 14.5 12.0 - 16.0 g/dL    Hematocrit 46.0 36.0 - 46.0 %    MCV 94 80 - 100 fL    MCH 29.7 26.0 - 34.0 pg    MCHC 31.5 (L) 32.0 - 36.0 g/dL    RDW 13.0 11.5 - 14.5 %    Platelets 223 150 - 450 x10*3/uL   Troponin I, High Sensitivity   Result Value Ref Range    Troponin I, High Sensitivity 183 (HH) 0 - 13 ng/L   Transthoracic Echo (TTE) Limited   Result Value Ref Range    AV pk joseluis 1.58 m/s    LV Biplane EF 87 %    LVOT diam 1.84 cm    MV E/A ratio 1.09     AV mn grad 6 mmHg    RVSP 22.3 mmHg    LVIDd 4.15 cm    Aortic Valve Area by Continuity of Peak Velocity 2.36 cm2    AV pk grad 10 mmHg    Aortic Valve Area by Continuity of VTI 2.46 cm2    LV A4C EF 77.0      ECG 12 Lead    Result Date: 12/5/2024  Sinus bradycardia Possible Left atrial enlargement Otherwise normal ECG When compared with ECG of 11-SEP-2020 03:39, No significant change was found Confirmed by Humphrey Lehman ([id number]) on 12/5/2024 1:05:13  PM    Transthoracic Echo (TTE) Limited    Result Date: 12/5/2024            [facility] [place] [address] [facility], [facility] [address]    Tel [phone] Fax [phone] TRANSTHORACIC ECHOCARDIOGRAM REPORT Patient Name:       ALEX MARTIN        Reading Physician:    [id number] Antione Stephens MD Study Date:         12/5/2024            Ordering Provider:    [id number] ROXANNE MCKEON MRN/PID:            [id number]             Fellow: Accession#:         [id number]         Nurse: Date of Birth/Age:  1950 / 74 years  Sonographer:          Madie Lowe RDCS Gender Assigned at  F                    Additional Staff: Birth: Height:             165.10 cm            Admit Date:           12/4/2024 Weight:             79.83 kg             Admission Status:     Inpatient -                                                                Priority                                                                discharge BSA / BMI:          1.87 m2 / 29.29      Department Location:  [facility]                     kg/m2 Blood Pressure: 147 /60 mmHg Study Type:    TRANSTHORACIC ECHO (TTE) LIMITED Diagnosis/ICD: Non ST elevation (NSTEMI) myocardial infarction-I21.4; Abnormal                electrocardiogram [ECG] [EKG]-R94.31 Indication:    NSTEMI, Abn EKG CPT Codes:     Echo Limited-49025  Study Detail: The following Echo studies were performed: 2D, Doppler, M-Mode and               color flow.  PHYSICIAN INTERPRETATION: Left Ventricle: There is mild to moderate eccentric left ventricular hypertrophy involving the septal wall. There are no regional wall motion abnormalities. The left ventricular cavity size is normal. There is moderately increased septal and mildly increased posterior left ventricular wall thickness. There is left ventricular hypertrophy involving the septal wall. There is left ventricular  concentric remodeling. Spectral Doppler shows a Grade I (impaired relaxation pattern) of left ventricular diastolic filling with normal left atrial filling pressure. LV Wall Scoring: All segments are normal. Left Atrium: The left atrium is upper limits of normal in size. Right Ventricle: The right ventricle is normal in size. There is normal right ventricular global systolic function. Right Atrium: The right atrium is normal in size. Aortic Valve: The aortic valve is trileaflet. There is mild aortic valve thickening. There is no evidence of aortic valve stenosis. The aortic valve dimensionless index is 0.92. There is no evidence of aortic valve regurgitation. The peak instantaneous gradient of the aortic valve is 10 mmHg. The mean gradient of the aortic valve is 6 mmHg. Mid cavitary minimal dyanamic gradient dagger shapped flow Less than 2 m/sec. Mitral Valve: The mitral valve is normal in structure. There is trace mitral valve regurgitation. Tricuspid Valve: The tricuspid valve is structurally normal. There is trace to mild tricuspid regurgitation. The Doppler estimated RVSP is within normal limits at 22.3 mmHg. Pulmonic Valve: The pulmonic valve is structurally normal. There is no indication of pulmonic valve regurgitation. Pericardium: No pericardial effusion noted. Aorta: The aortic root is normal. There is no dilatation of the ascending aorta. There is no dilatation of the aortic root. Pulmonary Artery: The main pulmonary artery is normal in size, and position, with normal bifurcation into the left and right pulmonary arteries. Systemic Veins: The hepatic vein appears to be of normal size. The inferior vena cava appears normal in size, with IVC inspiratory collapse greater than 50%. The hepatic vein shows a normal flow pattern.  CONCLUSIONS:  1. Spectral Doppler shows a Grade I (impaired relaxation pattern) of left ventricular diastolic filling with normal left atrial filling pressure.  2. There is normal right  ventricular global systolic function.  3. Right ventricular systolic pressure is within normal limits.  4. Aortic valve stenosis is not present.  5. There is moderately increased septal and mildly increased posterior left ventricular wall thickness. QUANTITATIVE DATA SUMMARY:  2D MEASUREMENTS:           Normal Ranges: IVSd:            1.29 cm   (0.6-1.1cm) LVPWd:           1.04 cm   (0.6-1.1cm) LVIDd:           4.15 cm   (3.9-5.9cm) LVIDs:           2.76 cm LV Mass Index:   89.4 g/m2 LV % FS          33.4 %  LV SYSTOLIC FUNCTION BY 2D PLANIMETRY (MOD):                   Normal Ranges: EF-A4C View: 77 % (>=55%) EF-A2C View: 88 % EF-Biplane:  87 %  LV DIASTOLIC FUNCTION:            Normal Ranges: MV Peak E:             0.66 m/s   (0.7-1.2 m/s) MV Peak A:             0.60 m/s   (0.42-0.7 m/s) E/A Ratio:             1.09       (1.0-2.2) MV e'                  0.085 m/s  (>8.0) MV lateral e'          0.09 m/s MV medial e'           0.08 m/s E/e' Ratio:            7.71       (<8.0) PulmV Sys Galo:         46.09 cm/s PulmV Aguero Galo:        41.55 cm/s PulmV S/D Galo:         1.11 PulmV A Revs Galo:      16.37 cm/s PulmV A Revs Dur:      85.63 msec  MITRAL VALVE:          Normal Ranges: MV DT:        248 msec (150-240msec)  AORTIC VALVE:                      Normal Ranges: AoV Vmax:                1.58 m/s  (<=1.7m/s) AoV Peak PG:             10.0 mmHg (<20mmHg) AoV Mean P.2 mmHg  (1.7-11.5mmHg) LVOT Max Galo:            1.39 m/s  (<=1.1m/s) AoV VTI:                 42.96 cm  (18-25cm) LVOT VTI:                39.59 cm LVOT Diameter:           1.84 cm   (1.8-2.4cm) AoV Area, VTI:           2.46 cm2  (2.5-5.5cm2) AoV Area,Vmax:           2.36 cm2  (2.5-4.5cm2) AoV Dimensionless Index: 0.92  TRICUSPID VALVE/RVSP:          Normal Ranges: Peak TR Velocity:     2.20 m/s RV Syst Pressure:     22 mmHg  (< 30mmHg)  Pulmonary Veins: PulmV A Revs Dur: 85.63 msec PulmV A Revs Galo: 16.37 cm/s PulmV Aguero Galo:   41.55 cm/s  PulmV S/D Galo:    1.11 PulmV Sys Galo:    46.09 cm/s  [id number] Antione Stephens MD Electronically signed on 12/5/2024 at 12:53:14 PM  Wall Scoring  ** Final **     CT chest abdomen pelvis w IV contrast    Result Date: 12/4/2024  Interpreted By:  Elizabet Villalba, STUDY: CT CHEST ABDOMEN PELVIS W IV CONTRAST;  12/4/2024 4:13 pm   INDICATION: Signs/Symptoms:Trauma.   COMPARISON: CT abdomen and pelvis 03/19/2009   ACCESSION NUMBER(S): [id number]   ORDERING CLINICIAN: AWAIS VILLA   TECHNIQUE: CT of the chest, abdomen, and pelvis was performed. Contiguous axial images were obtained at 3 mm slice thickness through the chest, abdomen and pelvis. Coronal and sagittal reconstructions at 3 mm slice thickness were performed. 90 mL Omnipaque 350   FINDINGS: CHEST:   LUNG/PLEURA/LARGE AIRWAYS: There is no infiltrate or pleural fluid.  There is no pulmonary nodule.   VESSELS: There is enlargement of the main pulmonary outflow tract and right and left main pulmonary arteries which may be secondary to pulmonary artery hypertension.   HEART: The heart is unremarkable.   MEDIASTINUM AND JUSTINE: There is a 1.9 x 1.2 cm enlarged right hilar lymph node.   CHEST WALL AND LOWER NECK: The chest wall is unremarkable. There is no abnormality of the lower neck.   ABDOMEN:   LIVER: There is no hepatic mass.   BILE DUCTS: There is no intrahepatic, common hepatic or common bile ductal dilatation.   GALLBLADDER: Status post cholecystectomy.   PANCREAS: There is no abnormality of the pancreas.   SPLEEN: The spleen is unremarkable. There is no splenic mass. There is no splenomegaly   ADRENAL GLANDS: The adrenal glands are unremarkable.   KIDNEYS AND URETERS:. The kidneys function symmetrically. There is no renal mass. There is no intrarenal calculus or hydronephrosis.     PELVIS:   BLADDER: The bladder is unremarkable.   REPRODUCTIVE ORGANS: There is no abnormality of the reproductive organs.   BOWEL: There is no bowel wall thickening, dilatation or  obstruction. There is a large amount of stool throughout the colon. The appendix is normal.   VESSELS: The abdominal and pelvic vessels are unremarkable.   PERITONEUM/RETROPERITONEUM/LYMPH NODES: There is no retroperitoneal or pelvic adenopathy.  There is no ascites.   ABDOMINAL WALL: The abdominal wall is unremarkable.   BONES AND SOFT TISSUES: There is an impacted basicervical fracture of the right femur with 1.0 cm impaction. There is a varus deformity. This extends into the greater and lesser trochanters.   There is no soft tissue abnormality.       CHEST: 1. No acute chest trauma. 2. Enlarged right hilar lymph node.   ABDOMEN AND PELVIS: 1. Impacted basicervical fracture right femur with 1 cm impaction and varus deformity. This extends into the greater and lesser trochanters. 2. Status post cholecystectomy. 3. Constipation.   MACRO: None   Signed by: Elizabet Villalba 12/4/2024 4:42 PM Dictation workstation:   [id number]    CT lumbar spine wo IV contrast    Result Date: 12/4/2024  Interpreted By:  Elizabet Villalba, STUDY: CT LUMBAR SPINE WO IV CONTRAST  12/4/2024 4:20 pm   INDICATION: Signs/Symptoms:Trauma     COMPARISON: 08/25/2011   ACCESSION NUMBER(S): [id number]   ORDERING CLINICIAN: AWAIS VILLA   TECHNIQUE: Axial CT images of the lumbar spine are obtained. Axial, coronal and sagittal reconstructions are provided for review.   FINDINGS: There is moderate-to-marked anterior osteophytic spurring at all levels. Alignment: Within normal limits.   Vertebrae/Disc Spaces:   The vertebral body heights are intact. The disc spaces are preserved.   Lower Thoracic Spine:  There is no significant central canal stenosis in the included lower thoracic region.   T12-L1:  There is no significant central canal stenosis.   L1-2:  There is moderate ligamentum flavum and facet joint hypertrophy. There is posterior bony spondylosis extending more so to the left of midline. This causes mild central canal stenosis. The neural  foramina are patent.   L2-3:  There is moderate ligamentum flavum and facet joint hypertrophy. There is no significant central canal or neural foraminal stenosis.   L3-4:  There is marked ligamentum flavum and facet joint hypertrophy with posterior bony spondylosis and diffuse disc bulge. There is marked central canal stenosis. The neural foramina are patent.   L4-5:  There is marked facet joint hypertrophy with a diffuse disc bulge. There is no central canal stenosis or neural foraminal compromise.   L5-S1: There is marked facet joint hypertrophy. There is no central canal stenosis or neural foraminal compromise. There is no significant central canal or neural foraminal stenosis.   Prevertebral/Paraspinal Soft Tissues: The prevertebral and paraspinal soft tissues are unremarkable.       1. No acute bony abnormality lumbar spine. 2. At L1-2, there is mild central canal stenosis. 3. At L3-4, there is marked central canal stenosis.   MACRO: None   Signed by: Elizabet Villalba 12/4/2024 4:37 PM Dictation workstation:   [id number]    CT thoracic spine wo IV contrast    Result Date: 12/4/2024  Interpreted By:  Elizabet Villalba, STUDY: CT THORACIC SPINE WO IV CONTRAST;  12/4/2024 4:20 pm   INDICATION: Signs/Symptoms:Trauma.     COMPARISON: None.   ACCESSION NUMBER(S): [id number]   ORDERING CLINICIAN: AWAIS VILLA   TECHNIQUE: Axial CT images of the thoracic spine are obtained. Axial, coronal and sagittal reconstructions are submitted for review.   FINDINGS: There is moderate-to-marked anterior osteophytic spurring of the entire thoracic spine. There is a mild right convex thoracic scoliosis.   Alignment: Within normal limits.   Vertebrae/Intervertebral Discs: The thoracic vertebral body heights are intact. The disc spaces are preserved. There is no significant central canal stenosis.   Paraspinous Soft Tissues: Within normal limits.         No acute bony abnormality thoracic spine.   MACRO: None   Signed by: Elizabet  Orly 12/4/2024 4:33 PM Dictation workstation:   [id number]    CT head W O contrast trauma protocol    Result Date: 12/4/2024  Interpreted By:  Dontae Jason, STUDY: CT HEAD W/O CONTRAST TRAUMA PROTOCOL; CT CERVICAL SPINE WO IV CONTRAST;  12/4/2024 4:06 pm   INDICATION: Trauma. Signs/Symptoms:Trauma.     COMPARISON: September 10, 2020 head CT, September 11, 2020 MRI brain September 10, 2020 CT angiography head neck   ACCESSION NUMBER(S): [id number]; [id number]   ORDERING CLINICIAN: AWAIS VILLA   TECHNIQUE: Axial noncontrast head and cervical spine CT   FINDINGS: Compared with prior examinations newly seen mild focal encephalomalacia left posterior insular region related to remote infarct.   There is no evidence of acute intra, or extra-axial fluid collection, mass, nor mass effect. There is normal gray-white differentiation.   Paranasal sinuses: No significant abnormality.   Calvarium: No evident fracture.   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   Cervical spine:   Alignment: Similar alignment including about 2 mm C3 anterolisthesis.   Cranial cervical junction: Intact.   Fracture: No acute fracture.   Vertebra and intervertebral disc spaces: Similar multilevel spondylosis and degenerative disc changes contributing to similar degree of canal and foraminal narrowing most pronounced at C5-6.   Paravertebral soft tissues: No evident hematoma. Scattered arterial vascular calcifications. There are metallic clips left neck. No there are bilateral thyroid nodules including dominant right lobe hypodense nodule image 210/14 estimated at 17 mm compared with about 14 mm September 10, 2020.   Brain Injury (BIG) guidelines CT values:   Skull fracture: No SDH (subdural hematoma): None detected EDH (epidural hemtoma): None detected IPH (intraparenchymal hemorrhage): None detected SAH (subarachnoid hemorrhage): None detected IVH (intraventricular hemorrhage): No   Reference: Doe  B, Manuel EMERY, Titi M, et al. The BIG (brain injury guidelines) project: defining the management of traumatic brain injury by acute care surgeons. J Trauma Acute Care Surg. 2014;76:228f626.       Head: No acute intracranial abnormality was identified.   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   Cervical spine: No acute fracture or subluxation.   Similar moderate to advanced multilevel degenerative changes contributing to canal narrowing most pronounced C5-6.   Multinodular thyroid gland including dominant nodule measuring about 17 mm right lobe compared with 14 mm 2020 examination. Recommend baseline thyroid ultrasound.   MACRO: Incidental Finding:  There are few small hypoattenuating nodules measuring equal to or greater than 1.5 cm in the thyroid gland. (**-YCF-**)   Instructions:  Further evaluation with nonemergent thyroid ultrasound. (Managing Incidental Thyroid Nodules Detected on Imaging: White Paper of the ACR Incidental Thyroid Findings Committee. Jsoefina Rader et al. Journal of the American College of Radiology,Volume 12, Issue 2, 143 - 150.) [email or web address]     Signed by: Dontae Jason 12/4/2024 4:24 PM Dictation workstation:   [id number]    CT cervical spine wo IV contrast    Result Date: 12/4/2024  Interpreted By:  Dontae Jason, STUDY: CT HEAD W/O CONTRAST TRAUMA PROTOCOL; CT CERVICAL SPINE WO IV CONTRAST;  12/4/2024 4:06 pm   INDICATION: Trauma. Signs/Symptoms:Trauma.     COMPARISON: September 10, 2020 head CT, September 11, 2020 MRI brain September 10, 2020 CT angiography head neck   ACCESSION NUMBER(S): [id number]; [id number]   ORDERING CLINICIAN: AWAIS VILLA   TECHNIQUE: Axial noncontrast head and cervical spine CT   FINDINGS: Compared with prior examinations newly seen mild focal encephalomalacia left posterior insular region related to remote infarct.   There is no evidence of acute intra, or extra-axial fluid collection, mass, nor mass effect. There is normal  gray-white differentiation.   Paranasal sinuses: No significant abnormality.   Calvarium: No evident fracture.   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   Cervical spine:   Alignment: Similar alignment including about 2 mm C3 anterolisthesis.   Cranial cervical junction: Intact.   Fracture: No acute fracture.   Vertebra and intervertebral disc spaces: Similar multilevel spondylosis and degenerative disc changes contributing to similar degree of canal and foraminal narrowing most pronounced at C5-6.   Paravertebral soft tissues: No evident hematoma. Scattered arterial vascular calcifications. There are metallic clips left neck. No there are bilateral thyroid nodules including dominant right lobe hypodense nodule image 210/14 estimated at 17 mm compared with about 14 mm September 10, 2020.   Brain Injury (BIG) guidelines CT values:   Skull fracture: No SDH (subdural hematoma): None detected EDH (epidural hemtoma): None detected IPH (intraparenchymal hemorrhage): None detected SAH (subarachnoid hemorrhage): None detected IVH (intraventricular hemorrhage): No   Reference: Doe FARFAN, Manuel RS, Titi M, et al. The BIG (brain injury guidelines) project: defining the management of traumatic brain injury by acute care surgeons. J Trauma Acute Care Surg. 2014;76:113j311.       Head: No acute intracranial abnormality was identified.   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   Cervical spine: No acute fracture or subluxation.   Similar moderate to advanced multilevel degenerative changes contributing to canal narrowing most pronounced C5-6.   Multinodular thyroid gland including dominant nodule measuring about 17 mm right lobe compared with 14 mm 2020 examination. Recommend baseline thyroid ultrasound.   MACRO: Incidental Finding:  There are few small hypoattenuating nodules measuring equal to or greater than 1.5 cm in the thyroid gland. (**-YCF-**)   Instructions:  Further  evaluation with nonemergent thyroid ultrasound. (Managing Incidental Thyroid Nodules Detected on Imaging: White Paper of the ACR Incidental Thyroid Findings Committee. Josefina Rader. et al. Journal of the American College of Radiology,Volume 12, Issue 2, 143 - 150.) [email or web address]     Signed by: Dontae Jason 12/4/2024 4:24 PM Dictation workstation:   [id number]    XR pelvis 1-2 views    Result Date: 12/4/2024  Interpreted By:  Cecil Byers, STUDY: XR PELVIS 1-2 VIEWS;  12/4/2024 3:32 pm   INDICATION: Signs/Symptoms:trauma, right hip pain.   COMPARISON: Prior exam is from 04/29/2011..   ACCESSION NUMBER(S): [id number]   ORDERING CLINICIAN: AWAIS VILLA   TECHNIQUE: Portable AP view pelvis was obtained.   FINDINGS: Advanced sclerotic arthritic changes in both SI joints with partial arthritic fusion bilaterally. Distal lumbar spine disc space narrowing with endplate osteophytosis. Bilateral hip joint spaces are preserved. No lytic or blastic destructive bone lesion. There is an acute comminuted fracture through the intertrochanteric proximal right femur with mild medial and proximal migration of the main shaft fragment relative to the main head/neck fragment, and subsequent angulation of those fragments. No dislocation. No other fracture. No opaque soft tissue foreign body. No periosteal reaction or erosion. Moderate stool in the imaged lower colon and rectum.       Acute comminuted, impacted, displaced, and angulated proximal right femoral intertrochanteric fracture. No dislocation.   MACRO: None   Signed by: Cecil Byers 12/4/2024 3:36 PM Dictation workstation:   [id number]    XR chest 1 view    Result Date: 12/4/2024  Interpreted By:  Peter Wilcox, STUDY: XR CHEST 1 VIEW;  12/4/2024 3:31 pm   INDICATION: Signs/Symptoms:trauma.   COMPARISON: Chest radiograph 09/10/2020   ACCESSION NUMBER(S): [id number]   ORDERING CLINICIAN: AWAIS VILLA   FINDINGS:     CARDIOMEDIASTINAL SILHOUETTE: Cardiomediastinal  silhouette is normal in size and configuration.   LUNGS: No consolidation, pneumothorax, or significant effusion.   ABDOMEN: No remarkable upper abdominal findings.   BONES: No acute osseous changes.       1.  No evidence of acute cardiopulmonary process.     Signed by: Peter Wilcox 12/4/2024 3:33 PM Dictation workstation:   [id number]    Vascular US Carotid Artery Duplex Bilateral    Result Date: 11/16/2024          [place] [address] Tel [phone] and Fax [phone]  Vascular Lab Report [facility] US CAROTID ARTERY DUPLEX BILATERAL  Patient Name:     ALEX MARTIN    Reading           [id number] Ajay Mckeon MD,                                      Physician:        MARIANGEL Study Date:       11/15/2024         Ordering          [id number] ANTONINO PEARCE                                      Physician: MRN/PID:          [id number]           Technologist:     Cyndi Lloyd HARESH Accession#:       [id number]       Technologist 2: Date of           1950 / 74      Encounter#:       [id number] Birth/Age:        years Gender:           F Admission Status: Outpatient         Location          [facility]                                      Performed:  Diagnosis/ICD: Occlusion and stenosis of left carotid artery-I65.22 Indication:    Carotid Occlusion/Stenosis w/o infarct CPT Codes:     63971 Cerebrovascular Carotid Duplex scan complete  Patient History H/o left CEA.  CONCLUSIONS: Right Carotid: Findings are consistent with less than 50% stenosis of the right proximal internal carotid artery. Right external carotid artery appears patent with no evidence of stenosis. The right vertebral artery is patent with antegrade flow. No evidence of hemodynamically significant stenosis in the right subclavian artery. Left Carotid: Findings are consistent with less than 50% stenosis of the left proximal internal carotid artery. Left external carotid artery appears patent with no evidence of  stenosis. The left vertebral artery is patent with antegrade flow. No evidence of hemodynamically significant stenosis in the left subclavian artery.  Comparison: Compared with study from 11/14/2023, no significant change.  Imaging & Doppler Findings: Right Plaque Morph: The proximal right internal carotid artery demonstrates heterogenous and calcified plaque. The right carotid bulb demonstrates heterogenous and calcified plaque. Left Plaque Morph: The proximal left external carotid artery demonstrates heterogenous plaque.   Right                        Left   PSV      EDV                PSV       cm/s           CCA P    84 cm/s 58 cm/s            CCA D    58 cm/s 60 cm/s  12 cm/s   ICA P    44 cm/s  12 cm/s 79 cm/s  18 cm/s   ICA M    62 cm/s  20 cm/s 89 cm/s  19 cm/s   ICA D    66 cm/s  19 cm/s 112 cm/s            ECA     169 cm/s 35 cm/s  7 cm/s  Vertebral  50 cm/s  12 cm/s 130 cm/s         Subclavian 149 cm/s               Right Left ICA/CCA Ratio  1.0  0.8   [id number] Ajay Mckeon MD, RPVI Electronically signed by [id number] Ajay Mckeon MD, RPNANNETTE on 11/16/2024 at 12:11:25 PM  ** Final **      Assessment/Plan    74-year-old female with history of hypertension, hyperlipidemia, mild CAD, previous CVA, bilateral carotid stenosis status post enterectomy who presented to the hospital due to a mechanical fall. While in the ED, she was hypertensive (likely due to the pain) BMP did not show any acute finding. CBC showed white count of 18 likely stress-induced. Imaging workup did not show any acute findings except impacted basicervical fracture right femur with 1 cm impaction and varus deformity with extension into the greater and lesser trochanters. Her initial troponin was 33 with a repeat uptrending to 89, but no EKG changes.   Patient was seen by orthopedic plan for trochanteric nailing.  -Patient has up trended troponin 33 in ED up trended to 89>> 127>> 183.  -Cardiology was consulted, recommended echocardiogram to  assess LV function and wall motion prior to surgery.  -Echocardiogram 12/5 revealed: 1. Grade I (impaired relaxation pattern) of left ventricular diastolic filling with normal left atrial filling pressure.  LV 60-65%   2. There is normal right ventricular global systolic function.   3. Right ventricular systolic pressure is within normal limits.   4. Aortic valve stenosis is not present.   5. There is moderately increased septal and mildly increased posterior left ventricular wall thickness.      # Right impacted basicervical fracture of the right femur with 1 cm impaction versus deformity  # Mechanical fall  -Patient tripped at transition between concrete driveway and the [employer].  Patient denies loss of consciousness  -Pedal pulse was normal bilaterally, no right lower limb swelling or change in color.  -Pelvis x-ray revealed acute comminuted, impacted, displaced and angulated proximal right femoral intertrochanteric fracture.  No dislocation.   -Patient was seen by orthopedic plan for trochanteric nailing.  Today at 4 PM  Plan:  -N.p.o.   -Multimodal pain management: Dilaudid for severe pain, oxycodone for moderate pain  -Hold heparin  -PT/OT     # Hypertension emergency  # Leukocytosis--improved  # Troponinemia  On admission patient was hypertensive 203/86.  She received 10 mg in ED. no blood pressure 144/66  -Leukocytosis 18 most likely due to stress due to mechanical fall  -Troponin up trending 33--89--127--189  -Cardiology was consulted, recommended echocardiogram to assess LV function and wall motion prior to surgery.  Which was unremarkable.  Plan:  -Patient on Tele  -Continue home medication for blood pressure: Losartan and carvedilol  -Continue to monitor CBC daily  -Cardiology recommending ischemic workup as outpatient such as nuclear stress test for further risk reduction.     # HLD  # Bilateral carotid artery stenosis s/p left carotid endarterectomy  # Hx of CVA  Continue home medication as appropriate         Global plan of care:  Diet: N.p.o.   Consult: Orthopedic  DVT prophylaxis: SCDs  CODE STATUS: Full code  Disposition: Anticipate 3-4 days for operative fixation and placement     This assessment and plan was discussed with senior resident  Geraldine Ny MD  Internal medicine, PGY 1

## 2024-12-05 NOTE — CONSULTS
Cardiology Consult           PATIENT NAME:  Maegan Shepherd       MRN: [id number]     DATE of SERVICE: December 5, 2024           Primary Care Physician: Gloria Lorenzo MD      Consulting Physician:  Dr Strong             Reason for consult:  preop cardiac evaluation. Elevate troponin     HPI:  This is a 74 y.o. 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 who presents with post mechanical fall after she was tripped  when she was picking up the packages at back door  .The Image study shown; Her image shown;right hip fracture for which she is scheduled for surgery.The pt denies any LOC or disoriented, she denies any chest pain or SOB or any palpitation        Lab; troponin 28--183  creatinine 0.98 GFR 60 K+ 4.1 Mg 2.34    EKG: sinus  Bradycardiac -HR 52 ,No acute ST/T changes     Echo:CONCLUSIONS:9/2020   1. The left ventricular systolic function is normal with a 60-65% estimated ejection fraction.   2. Spectral Doppler shows an impaired relaxation pattern of left ventricular diastolic filling.   3. No evidence of mitral valve prolapse.   4. There is No tricuspid stenosis.   5. RVSP within normal limits.   6. Aortic valve stenosis is not present.   7. No cardioembolic source seen, no bubble study performed.            Past Medical History:     Past Medical History:   Diagnosis Date    Cellulitis of left upper limb 07/23/2021    Cellulitis of hand, left    Cellulitis of unspecified toe 07/01/2016    Cellulitis, toe    Encounter for follow-up examination after completed treatment for conditions other than malignant neoplasm 09/23/2020    Status post vascular surgery, follow-up exam    Hyperlipidemia, unspecified     Dyslipidemia    Laceration without foreign body, left lower leg, initial encounter 05/12/2016    Laceration of leg, left    Occlusion and stenosis of left carotid artery 04/06/2021    Occlusion and stenosis  of left carotid artery    Ocular hypertension, unspecified eye     Borderline glaucoma with ocular hypertension    Open bite of unspecified hand, initial encounter 07/23/2021    Cat bite of hand, initial encounter    Open bite, left lower leg, sequela 07/29/2016    Dog bite of calf, left, sequela    Other conditions influencing health status     Chlamydial Pneumonia    Other conditions influencing health status     Acute Myocardial Infarction    Other conditions influencing health status     Coronary Artery Disease    Other conditions influencing health status     X-Ray    Other conditions influencing health status     X-Ray    Other conditions influencing health status     Screening for malignant neoplasms, colon    Other conditions influencing health status 01/27/2017    History of cough    Pain in right knee 08/30/2016    Right knee pain    Personal history of diseases of the skin and subcutaneous tissue     History of rosacea    Personal history of other diseases of the circulatory system     History of congestive heart disease    Personal history of other diseases of the circulatory system     History of hypertension    Personal history of other diseases of the musculoskeletal system and connective tissue     History of fibromyositis    Personal history of other diseases of the nervous system and sense organs     History of migraine headaches    Personal history of other diseases of the nervous system and sense organs     History of sleep apnea    Personal history of other endocrine, nutritional and metabolic disease     History of hypothyroidism    Personal history of other endocrine, nutritional and metabolic disease     History of obesity    Personal history of other medical treatment 06/03/2021    History of screening mammography    Personal history of other specified conditions 03/25/2013    History of abnormal mammogram    Personal history of other specified conditions 10/12/2021    History of headache     Personal history of other specified conditions 10/12/2021    History of nausea and vomiting    Personal history of other specified conditions 10/19/2021    History of chest pain    Restless legs syndrome     Restless legs syndrome    Sacrococcygeal disorders, not elsewhere classified 2016    Tail bone pain              Past Surgical History:     Past Surgical History:   Procedure Laterality Date    CHOLECYSTECTOMY  2012    Cholecystectomy    MR HEAD ANGIO WO IV CONTRAST  2020    MR HEAD ANGIO WO IV CONTRAST 2020 [facility]ICAL LEGAC[facility]    MR NECK ANGIO WO IV CONTRAST  2020    MR NECK ANGIO WO IV CONTRAST 2020 [facility] CLINICAL LEGACY    OTHER SURGICAL HISTORY  2022    Carotid thromboendarterectomy    OTHER SURGICAL HISTORY  10/05/2021    Foot surgery    OTHER SURGICAL HISTORY  10/05/2021    Colonoscopy    OTHER SURGICAL HISTORY  10/12/2021    Cervical loop electrosurgical excision    OTHER SURGICAL HISTORY  10/12/2021    Internal carotid artery ligation    OTHER SURGICAL HISTORY  10/12/2021    Mohs surgery    OTHER SURGICAL HISTORY  10/12/2021    Phacoemulsification of cataract and insertion of intraocular lens    OTHER SURGICAL HISTORY  2015    Dental Surgery    TUBAL LIGATION  2012    Tubal Ligation                  Family History:     Family History   Problem Relation Name Age of Onset    Hearing loss Father Teodoro     Stroke Father Peter     Anesthesia related problems Brother Ant     Hypertension Brother Ant     Breast cancer Mother's Sister Aunt Louisa     Diabetes Father's Sister Sr Villanueva             Social History:    Social History     Tobacco Use    Smoking status: Former     Current packs/day: 0.00     Average packs/day: 0.3 packs/day for 10.0 years (2.5 ttl pk-yrs)     Types: Cigarettes     Start date: 1968     Quit date: 1978     Years since quittin.6    Smokeless tobacco: Never    Tobacco comments:     quit on 28th birthday   Substance Use  "Topics    Alcohol use: Never    Drug use: Never            Allergies:      Allergies   Allergen Reactions    [place] Mushroom Other     Patient states flashing in both eyes   Poor circulation in part of brain    Valacyclovir Shortness of breath    Sulfamethoxazole Unknown    Trimethoprim Unknown    Amitriptyline Unknown     Pt states Dr. Stiles says \"don't take ever again.    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    Amoxicillin Rash    Amoxicillin-Pot Clavulanate Rash    Atenolol Swelling    Benazepril Itching    Chlorthalidone Other     kidney discomfort      Esomeprazole Diarrhea    Hydrochlorothiazide Hives and Itching    Hyoscyamine Itching, Unknown, Rash and Diarrhea     intolerance    Intolerance    Patient not sure    Ibuprofen Nausea Only     Pt states can take small amounts -over few days    Livalo [Pitavastatin Calcium] Other     Shooting pains in face    Pitavastatin Diarrhea, Itching and Other     shooting pains in face      Simvastatin Hives    Sulfamethoxazole-Trimethoprim Hives and Rash    Travoprost Headache     Headaches              Medications:     Prior to Admission Medications   Prescriptions Last Dose Informant Patient Reported? Taking?   PARoxetine CR (Paxil-CR) 12.5 mg 24 hr tablet 12/3/2024 Bedtime Self Yes Yes   Sig: Take 1 tablet (12.5 mg) by mouth once daily at bedtime.   aspirin 81 mg EC tablet 12/4/2024 Morning Self Yes Yes   Sig: Take 1 tablet (81 mg) by mouth once daily.   carvedilol (Coreg) 6.25 mg tablet 12/4/2024 Morning Self Yes Yes   Sig: Take 1 tablet (6.25 mg) by mouth twice a day.   ferrous gluconate (Fergon) 324 (38 Fe) mg tablet 12/4/2024 Morning Self Yes Yes   Sig: Take 1 tablet (38 mg of iron) by mouth once daily with breakfast.   rosuvastatin (Crestor) 20 mg tablet 12/4/2024 Morning Self Yes Yes   Sig: Take 1 tablet (20 mg) by mouth every other day.   valsartan (Diovan) 160 mg tablet 12/4/2024 Morning Self Yes Yes   Sig: Take 1 tablet (160 mg) by mouth once daily.    " "  Facility-Administered Medications: None      Scheduled medications   Medication Dose Route Frequency    [Held by provider] aspirin  81 mg oral Daily    carvedilol  6.25 mg oral BID    ferrous sulfate  150 mg oral Daily with breakfast    [Held by provider] heparin (porcine)  5,000 Units subcutaneous q8h    PARoxetine  10 mg oral Daily    polyethylene glycol  17 g oral Daily    [START ON 12/6/2024] rosuvastatin  20 mg oral Every other day    valsartan  160 mg oral Daily     PRN medications   Medication    acetaminophen    HYDROmorphone    HYDROmorphone    ondansetron ODT    Or    ondansetron    oxyCODONE     Continuous Medications   Medication Dose Last Rate    sodium chloride 0.9%  75 mL/hr         Review of Systems   Constitutional: Negative.   HENT:  Positive for hearing loss.    Eyes: Negative.    Cardiovascular:  Negative for chest pain, dyspnea on exertion, irregular heartbeat, near-syncope, palpitations and syncope.   Respiratory:  Negative for shortness of breath.    Endocrine: Negative.    Skin: Negative.    Musculoskeletal:  Positive for joint pain.   Gastrointestinal: Negative.    Genitourinary: Negative.    Neurological:  Negative for dizziness, light-headedness and weakness.   Psychiatric/Behavioral: Negative.          Vitals:     /60 (BP Location: Right arm, Patient Position: Lying)   Pulse 84   Temp 36.9 °C (98.4 °F) (Temporal)   Resp 16   Ht 1.651 m (5' 5\")   Wt 79.9 kg (176 lb 1.6 oz)   SpO2 95%   BMI 29.30 kg/m²     Patient Vitals for the past 24 hrs:   BP Temp Temp src Pulse Resp SpO2 Height Weight   12/05/24 0800 147/60 36.9 °C (98.4 °F) Temporal 84 16 95 % -- --   12/05/24 0400 123/58 36.3 °C (97.3 °F) Temporal 60 14 91 % -- --   12/05/24 0000 127/70 36.5 °C (97.7 °F) Temporal 65 16 95 % -- --   12/04/24 2000 -- -- -- -- -- 97 % -- --   12/04/24 1911 150/68 36.7 °C (98.1 °F) Temporal 74 18 97 % 1.651 m (5' 5\") 79.9 kg (176 lb 1.6 oz)   12/04/24 1815 138/62 -- -- 65 20 97 % -- -- " "  12/04/24 1800 155/69 -- -- 72 20 -- -- --   12/04/24 1745 137/59 -- -- 76 20 98 % -- --   12/04/24 1715 148/73 36.3 °C (97.3 °F) -- 71 19 99 % -- --   12/04/24 1700 (!) 182/88 -- -- 77 19 94 % -- --   12/04/24 1657 -- -- -- 73 -- -- -- --   12/04/24 1654 -- -- -- 62 -- -- -- --   12/04/24 1651 -- -- -- 64 -- -- -- --   12/04/24 1648 -- -- -- 63 -- -- -- --   12/04/24 1645 (!) 203/86 36.3 °C (97.3 °F) -- 71 20 95 % -- --   12/04/24 1642 -- -- -- 68 -- 95 % -- --   12/04/24 1639 -- -- -- 75 -- -- -- --   12/04/24 1636 -- -- -- 70 -- 98 % -- --   12/04/24 1633 -- -- -- 71 -- 99 % -- --   12/04/24 1630 (!) 196/91 36.3 °C (97.3 °F) -- 73 20 98 % -- --   12/04/24 1627 -- -- -- 76 -- -- -- --   12/04/24 1624 -- -- -- 78 -- 98 % -- --   12/04/24 1621 -- -- -- 73 -- 98 % -- --   12/04/24 1618 -- -- -- 72 -- 99 % -- --   12/04/24 1615 (!) 218/89 36.4 °C (97.5 °F) -- 74 20 97 % -- --   12/04/24 1612 -- -- -- 74 -- 97 % -- --   12/04/24 1610 (!) 212/95 36.2 °C (97.2 °F) Temporal 77 18 98 % -- --   12/04/24 1609 -- -- -- 76 18 97 % -- --   12/04/24 1608 -- -- -- 76 -- 98 % -- --   12/04/24 1541 (!) 197/83 36.2 °C (97.2 °F) Temporal 61 20 97 % -- --   12/04/24 1539 -- -- -- 58 -- (!) 87 % -- --   12/04/24 1536 -- -- -- 60 -- (!) 86 % -- --   12/04/24 1533 -- -- -- 58 -- 98 % -- --   12/04/24 1530 (!) 186/102 36.2 °C (97.2 °F) -- 61 20 97 % -- --   12/04/24 1527 -- -- -- 59 -- 98 % -- --   12/04/24 1524 -- -- -- 59 -- 98 % -- --   12/04/24 1521 -- -- -- 60 -- 99 % -- --   12/04/24 1520 -- -- -- -- -- -- 1.651 m (5' 5\") 75.8 kg (167 lb)   12/04/24 1518 -- -- -- 59 -- 99 % -- --   12/04/24 1515 175/88 36.2 °C (97.2 °F) Temporal 62 20 98 % -- --        Body mass index is 29.3 kg/m².     Vitals:    12/04/24 1911   Weight: 79.9 kg (176 lb 1.6 oz)          Intake/Output Summary (Last 24 hours) at 12/5/2024 0948  Last data filed at 12/5/2024 0629  Gross per 24 hour   Intake --   Output 550 ml   Net -550 ml           Physical " "Exam  HENT:      Head: Normocephalic.      Ears:      Comments: Hard to hearing   Cardiovascular:      Rate and Rhythm: Normal rate.   Pulmonary:      Effort: Pulmonary effort is normal.   Abdominal:      General: Abdomen is flat.   Musculoskeletal:      Cervical back: Normal range of motion.      Comments: Right leg deformed due to hip fracture, in great pain    Skin:     General: Skin is warm.   Neurological:      Mental Status: She is alert and oriented to person, place, and time.   Psychiatric:         Mood and Affect: Mood normal.          Labs:     Lab Results   Component Value Date    CKTOTAL 87 12/04/2024    TROPONINI <0.02 09/11/2020         Lab Results   Component Value Date    TROPHS 183 (HH) 12/05/2024    TROPHS 117 (HH) 12/04/2024    TROPHS 89 (HH) 12/04/2024      Lab Results   Component Value Date    GLUCOSE 128 (H) 12/05/2024    CALCIUM 9.6 12/05/2024     12/05/2024    K 4.1 12/05/2024    CO2 25 12/05/2024     12/05/2024    BUN 25 (H) 12/05/2024    CREATININE 0.98 12/05/2024      Lab Results   Component Value Date    WBC 12.8 (H) 12/05/2024    HGB 14.5 12/05/2024    HCT 46.0 12/05/2024    MCV 94 12/05/2024     12/05/2024        LABS:  ABGs: No results found for: \"PH\"  PRO-BNP: No results found for: \"PROBNP\"   TSH: No results found for: \"TSH\"   Lipid Profile: No results found for: \"TRIG\", \"HDL\", \"LDLCALC\", \"CHOL\"   Hemoglobin A1C: No results found for: \"HGBA1C\"   Magnesium:    Lab Results   Component Value Date    MG 2.34 12/05/2024              Assessment/Plan:   Preop cardiac evaluation;A 74 y.o. 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 who presents with post mechanical fall after she was tripped  when she was picking up the packages at back door  .The Image study shown; Her image shown;right hip fracture for which she is scheduled for surgery later today .The pt denies any LOC or disoriented, she " denies any chest pain or SOB or any palpitation .       The positive  Trending troponin in the setting of post trauma .The  pt denies any sx of CP or SOB, there is no acute EKG finding ,however, giving her hx of former smoker, carotid stenosis,HTN and hLD.will check Echo today to assess LV function and wall motion prior to her surgery( I called bio dept,will do echo this am )   We recommend the pt to have her surgery at  any time giving acute right hip fracture .she will be moderate risk from cardiac point for any invasive process giving former smoker with hx of carotid stenosis s/p left endarterectomy, HTN .HLD.CVA  .  She will need to have a ischemic work up as outpt such as nuclear stress test for further risk reduction     -continue coreg 6.25 mg bid Valsartan 160 mg .    Dr Strong will see the pt today     PATIENT SEEN AND EXAMINED, AGREE WITH ABOVE. PATIENT WITH HISTORY OF REMOTE CARDIAC CATH AND NON-OBSTRUCTIVE CAD. HAD MECHANICAL FALL TODAY WITH FRACTURE. INCIDENTAL NOTE OF TROPONIN ELEVATION. NO ANGINA, NO ISCHEMIC EKG CHANGES, AND ECHO TODAY WITH NORMAL LV FUNCTION AND REGIONAL WALL MOTION.     POSSIBILITY OF PROGRESSIVE CAD IS HIGH BUT PATIENT IS ASYMPTOMATIC WITH NORMAL FUNCTION AND REGIONAL WALL MOTION. WOULD KEEP HGB >9 POST-OPERATIVELY, BP CONTROL, AND POST-OPERATIVE EKG AND EKG IN AM TOMORROW. WOULD RESUME ASPIRIN POSTOPERATIVELY.     PATIENT WILL NEED ISCHEMIC WORK-UP ONCE RECOVERED. IF UNABLE TO DO PRIOR TO DC TO REHAB - WILL NEED TO BE DONE 1-2 WEEKS POST DISCHARGE.     WILL CHECK ON PATIENT POSTOPERATIVELY.   Consults

## 2024-12-06 ENCOUNTER — APPOINTMENT (OUTPATIENT)
Dept: CARDIOLOGY | Facility: HOSPITAL | Age: 74
End: 2024-12-06
Payer: MEDICARE

## 2024-12-06 VITALS
HEART RATE: 55 BPM | SYSTOLIC BLOOD PRESSURE: 124 MMHG | RESPIRATION RATE: 16 BRPM | WEIGHT: 176.1 LBS | TEMPERATURE: 98.1 F | BODY MASS INDEX: 29.34 KG/M2 | OXYGEN SATURATION: 100 % | HEIGHT: 65 IN | DIASTOLIC BLOOD PRESSURE: 49 MMHG

## 2024-12-06 PROBLEM — S72.141G CLOSED 2-PART INTERTROCHANTERIC FRACTURE OF PROXIMAL END OF FEMUR WITH DELAYED HEALING, RIGHT: Status: RESOLVED | Noted: 2024-12-04 | Resolved: 2024-12-06

## 2024-12-06 PROBLEM — M80.059D HIP FRACTURE DUE TO OSTEOPOROSIS WITH ROUTINE HEALING: Status: RESOLVED | Noted: 2024-12-05 | Resolved: 2024-12-06

## 2024-12-06 PROBLEM — S72.001A CLOSED FRACTURE OF RIGHT HIP, INITIAL ENCOUNTER: Status: RESOLVED | Noted: 2024-12-04 | Resolved: 2024-12-06

## 2024-12-06 LAB
ALBUMIN SERPL BCP-MCNC: 3.2 G/DL (ref 3.4–5)
ANION GAP SERPL CALC-SCNC: 10 MMOL/L (ref 10–20)
ATRIAL RATE: 55 BPM
BUN SERPL-MCNC: 44 MG/DL (ref 6–23)
CALCIUM SERPL-MCNC: 8.6 MG/DL (ref 8.6–10.3)
CHLORIDE SERPL-SCNC: 104 MMOL/L (ref 98–107)
CO2 SERPL-SCNC: 25 MMOL/L (ref 21–32)
CREAT SERPL-MCNC: 1.64 MG/DL (ref 0.5–1.05)
EGFRCR SERPLBLD CKD-EPI 2021: 32 ML/MIN/1.73M*2
ERYTHROCYTE [DISTWIDTH] IN BLOOD BY AUTOMATED COUNT: 12.9 % (ref 11.5–14.5)
GLUCOSE SERPL-MCNC: 138 MG/DL (ref 74–99)
HCT VFR BLD AUTO: 36 % (ref 36–46)
HGB BLD-MCNC: 11.4 G/DL (ref 12–16)
MAGNESIUM SERPL-MCNC: 2.13 MG/DL (ref 1.6–2.4)
MCH RBC QN AUTO: 30.7 PG (ref 26–34)
MCHC RBC AUTO-ENTMCNC: 31.7 G/DL (ref 32–36)
MCV RBC AUTO: 97 FL (ref 80–100)
NRBC BLD-RTO: 0 /100 WBCS (ref 0–0)
P AXIS: 53 DEGREES
P OFFSET: 204 MS
P ONSET: 147 MS
PHOSPHATE SERPL-MCNC: 4.9 MG/DL (ref 2.5–4.9)
PLATELET # BLD AUTO: 181 X10*3/UL (ref 150–450)
POTASSIUM SERPL-SCNC: 4.2 MMOL/L (ref 3.5–5.3)
PR INTERVAL: 138 MS
Q ONSET: 216 MS
QRS COUNT: 9 BEATS
QRS DURATION: 90 MS
QT INTERVAL: 464 MS
QTC CALCULATION(BAZETT): 443 MS
QTC FREDERICIA: 450 MS
R AXIS: 43 DEGREES
RBC # BLD AUTO: 3.71 X10*6/UL (ref 4–5.2)
SODIUM SERPL-SCNC: 135 MMOL/L (ref 136–145)
T AXIS: 23 DEGREES
T OFFSET: 448 MS
VENTRICULAR RATE: 55 BPM
WBC # BLD AUTO: 13.2 X10*3/UL (ref 4.4–11.3)

## 2024-12-06 PROCEDURE — 2500000001 HC RX 250 WO HCPCS SELF ADMINISTERED DRUGS (ALT 637 FOR MEDICARE OP)

## 2024-12-06 PROCEDURE — 83735 ASSAY OF MAGNESIUM: CPT

## 2024-12-06 PROCEDURE — 99232 SBSQ HOSP IP/OBS MODERATE 35: CPT | Performed by: INTERNAL MEDICINE

## 2024-12-06 PROCEDURE — 93005 ELECTROCARDIOGRAM TRACING: CPT

## 2024-12-06 PROCEDURE — 97161 PT EVAL LOW COMPLEX 20 MIN: CPT | Mod: GP

## 2024-12-06 PROCEDURE — 97530 THERAPEUTIC ACTIVITIES: CPT | Mod: GP

## 2024-12-06 PROCEDURE — 2500000002 HC RX 250 W HCPCS SELF ADMINISTERED DRUGS (ALT 637 FOR MEDICARE OP, ALT 636 FOR OP/ED)

## 2024-12-06 PROCEDURE — 85027 COMPLETE CBC AUTOMATED: CPT

## 2024-12-06 PROCEDURE — 80069 RENAL FUNCTION PANEL: CPT

## 2024-12-06 PROCEDURE — 2500000001 HC RX 250 WO HCPCS SELF ADMINISTERED DRUGS (ALT 637 FOR MEDICARE OP): Performed by: PHYSICIAN ASSISTANT

## 2024-12-06 PROCEDURE — 97165 OT EVAL LOW COMPLEX 30 MIN: CPT | Mod: GO | Performed by: OCCUPATIONAL THERAPIST

## 2024-12-06 PROCEDURE — 99238 HOSP IP/OBS DSCHRG MGMT 30/<: CPT

## 2024-12-06 PROCEDURE — 2500000004 HC RX 250 GENERAL PHARMACY W/ HCPCS (ALT 636 FOR OP/ED): Performed by: PHYSICIAN ASSISTANT

## 2024-12-06 PROCEDURE — 99231 SBSQ HOSP IP/OBS SF/LOW 25: CPT | Performed by: PHYSICIAN ASSISTANT

## 2024-12-06 PROCEDURE — 36415 COLL VENOUS BLD VENIPUNCTURE: CPT

## 2024-12-06 PROCEDURE — 2500000004 HC RX 250 GENERAL PHARMACY W/ HCPCS (ALT 636 FOR OP/ED): Mod: JZ

## 2024-12-06 RX ORDER — OXYCODONE HYDROCHLORIDE 5 MG/1
5 TABLET ORAL EVERY 6 HOURS PRN
Qty: 20 TABLET | Refills: 0 | Status: SHIPPED | OUTPATIENT
Start: 2024-12-06 | End: 2024-12-11

## 2024-12-06 RX ORDER — ENOXAPARIN SODIUM 100 MG/ML
30 INJECTION SUBCUTANEOUS DAILY
Status: DISCONTINUED | OUTPATIENT
Start: 2024-12-06 | End: 2024-12-06 | Stop reason: SDUPTHER

## 2024-12-06 RX ORDER — ENOXAPARIN SODIUM 100 MG/ML
30 INJECTION SUBCUTANEOUS
Start: 2024-12-07 | End: 2025-01-05

## 2024-12-06 ASSESSMENT — PAIN SCALES - GENERAL
PAINLEVEL_OUTOF10: 1
PAINLEVEL_OUTOF10: 0 - NO PAIN
PAINLEVEL_OUTOF10: 3
PAINLEVEL_OUTOF10: 0 - NO PAIN

## 2024-12-06 ASSESSMENT — COGNITIVE AND FUNCTIONAL STATUS - GENERAL
DAILY ACTIVITIY SCORE: 13
DRESSING REGULAR LOWER BODY CLOTHING: TOTAL
PERSONAL GROOMING: A LITTLE
HELP NEEDED FOR BATHING: TOTAL
MOVING FROM LYING ON BACK TO SITTING ON SIDE OF FLAT BED WITH BEDRAILS: A LOT
TURNING FROM BACK TO SIDE WHILE IN FLAT BAD: TOTAL
STANDING UP FROM CHAIR USING ARMS: TOTAL
WALKING IN HOSPITAL ROOM: TOTAL
CLIMB 3 TO 5 STEPS WITH RAILING: TOTAL
MOVING TO AND FROM BED TO CHAIR: TOTAL
MOBILITY SCORE: 7
DRESSING REGULAR UPPER BODY CLOTHING: A LOT
TOILETING: A LOT

## 2024-12-06 ASSESSMENT — PAIN DESCRIPTION - LOCATION: LOCATION: HIP

## 2024-12-06 ASSESSMENT — PAIN DESCRIPTION - ORIENTATION: ORIENTATION: LEFT

## 2024-12-06 ASSESSMENT — PAIN - FUNCTIONAL ASSESSMENT
PAIN_FUNCTIONAL_ASSESSMENT: 0-10

## 2024-12-06 ASSESSMENT — ACTIVITIES OF DAILY LIVING (ADL): ADL_ASSISTANCE: INDEPENDENT

## 2024-12-06 NOTE — PROGRESS NOTES
Physical Therapy    Physical Therapy Evaluation and Treatment    Patient Name: Maegan Shepherd  MRN: [id number]  Today's Date: 12/6/2024   Time Calculation  Start Time: 1042  Stop Time: 1107  Time Calculation (min): 25 min  4111/4111-A    Assessment/Plan   PT Assessment  PT Assessment Results: Decreased strength, Decreased range of motion, Decreased endurance, Impaired balance, Decreased mobility, Pain, Orthopedic restrictions  Rehab Prognosis: Good  Evaluation/Treatment Tolerance: Patient limited by pain  Medical Staff Made Aware: Yes  End of Session Communication: Bedside nurse  Assessment Comment: Pt presents today below baseline level of function and requires continued PT during hospital stay. Pt requires 24 hour physical assist for all mobility to prevent falls. Pt is unsafe to navigate home environment at this time with available support and assist. Pt requires PT at a moderate intensity level at discharge to maximize functional mobility and safety.    End of Session Patient Position: Alarm on, Bed, 3 rail up  IP OR SWING BED PT PLAN  Inpatient or Swing Bed: Inpatient  PT Plan  Treatment/Interventions: Bed mobility, Transfer training, Gait training, Balance training, Neuromuscular re-education, Strengthening, Endurance training, Range of motion, Therapeutic exercise, Therapeutic activity, Home exercise program  PT Plan: Ongoing PT  PT Frequency: Daily  PT Discharge Recommendations: Moderate intensity level of continued care  Equipment Recommended upon Discharge: Wheeled walker  PT Recommended Transfer Status: Assist x2  PT - OK to Discharge: Yes (To next level of care when cleared by medical team   )    Subjective       General Visit Information:  General  Reason for Referral: recent surgery  Referred By: Humphrey Holbrook MD  Past Medical History Relevant to Rehab: To hospital due to fall and confusion. Pt found to have proximal femur fracture. Pt underwent Right hip closed reduction of an intertrochanteric hip  fracture and placement of an intramedullary nail. PMH: HTN, HLD,CVA, bilateral carotid artery stenosis s/p left carotid endartectomy  Family/Caregiver Present: No  Co-Treatment: OT  Co-Treatment Reason: for safety  Prior to Session Communication: Bedside nurse  Patient Position Received: Bed, 3 rail up, Alarm on  Preferred Learning Style: verbal, visual  General Comment: Pt agreeable to PT, nursing cleared for treatment.    Home Living:  Home Living  Type of Home: House  Lives With: Alone  Home Adaptive Equipment: Cane  Home Layout: One level  Home Access:  (2 + one RAMÓN)  Bathroom Shower/Tub: Walk-in shower  Bathroom Equipment: Grab bars in shower, Shower chair with back  Home Living Comments: has stair lift to basement    Prior Level of Function:  Prior Function Per Pt/Caregiver Report  ADL Assistance: Independent  Homemaking Assistance: Independent  Ambulatory Assistance:  (mod I with cane)    Precautions:  Precautions  Hearing/Visual Limitations: Alutiiq  LE Weight Bearing Status: Weight Bearing as Tolerated (right LE)  Medical Precautions: Fall precautions       Objective     Pain:  Pain Assessment  Pain Assessment: 0-10  0-10 (Numeric) Pain Score:  (denies pain at rest but reports pain with movement, does not rate)  Pain Type: Surgical pain  Pain Location: Hip  Pain Orientation: Right  Pain Interventions: Ambulation/increased activity, Repositioned    Cognition:  Cognition  Orientation Level: Oriented X4  Problem Solving:  (impaired)  Safety/Judgement:  (impaired)  Insight: Mild    General Assessments:      Activity Tolerance  Endurance: Tolerates 10 - 20 min exercise with multiple rests                 Static Sitting Balance  Static Sitting-Comment/Number of Minutes: poor  Dynamic Sitting Balance  Dynamic Sitting-Comments: poor  Static Standing Balance  Static Standing-Comment/Number of Minutes: unable to balance    Functional Assessments:     Bed Mobility  Bed Mobility: Yes  Bed Mobility 1  Bed Mobility 1: Supine  to sitting  Level of Assistance 1: Dependent  Bed Mobility Comments 1: x 2  Transfers  Transfer: Yes  Transfer 1  Transfer From 1: Sit to  Transfer to 1: Stand  Transfer Device 1: Walker  Transfer Level of Assistance 1: Maximum assistance, +2  Trials/Comments 1: able to complete 25% of stand  Transfers 2  Transfer From 2: Stand to  Transfer to 2: Sit  Transfer Device 2: Walker  Transfer Level of Assistance 2: Maximum assistance, +2  Ambulation/Gait Training  Ambulation/Gait Training Performed: No        Treatment    Verbal and tactile cues to facilitate abduction/adduction of BLE's and increased use of bed rail to transition supine<>sitting EOB. Cues for placing feet flat on floor at EOB and squaring hips to maximize safety. Cues provided for achieving upright posture and reducing retro lean sitting up at EOB. Cues for safe hand placement with use of FWW for sit<>stand.      Extremity/Trunk Assessments:        RLE   RLE : Exceptions to WFL  AROM RLE (degrees)  RLE AROM Comment: hip 25% or less, knee 75%, ankle WFL  Strength RLE  RLE Overall Strength:  (grossly 3-/5 throughout)  LLE   LLE : Within Functional Limits    Outcome Measures:     [facility] Basic Mobility  Turning from your back to your side while in a flat bed without using bedrails: A lot  Moving from lying on your back to sitting on the side of a flat bed without using bedrails: Total  Moving to and from bed to chair (including a wheelchair): Total  Standing up from a chair using your arms (e.g. wheelchair or bedside chair): Total  To walk in hospital room: Total  Climbing 3-5 steps with railing: Total  Basic Mobility - Total Score: 7                         Goals:  Encounter Problems       Encounter Problems (Active)       PT Problem       Pt will perform bed mobility with min A.  (Progressing)       Start:  12/06/24    Expected End:  12/20/24            Pt will complete sit <> stand and bed <> chair transfers with min A. (Progressing)       Start:  12/06/24     Expected End:  12/20/24            Pt will ambulate 10 feet min A using FWW with no LOB. (Progressing)       Start:  12/06/24    Expected End:  12/20/24            Pt will progress to completing 3 x 20 supine/seated exercises in order to increase strength and improve gait mechanics.   (Progressing)       Start:  12/06/24    Expected End:  12/20/24                 Education Documentation  Precautions, taught by Lexis Ziegler, PT at 12/6/2024  1:49 PM.  Learner: Patient  Readiness: Acceptance  Method: Explanation, Demonstration  Response: Verbalizes Understanding, Needs Reinforcement    Body Mechanics, taught by Lexis Ziegler PT at 12/6/2024  1:49 PM.  Learner: Patient  Readiness: Acceptance  Method: Explanation, Demonstration  Response: Verbalizes Understanding, Needs Reinforcement    Mobility Training, taught by Lexis Ziegler, PT at 12/6/2024  1:49 PM.  Learner: Patient  Readiness: Acceptance  Method: Explanation, Demonstration  Response: Verbalizes Understanding, Needs Reinforcement    Education Comments  No comments found.

## 2024-12-06 NOTE — PROGRESS NOTES
"Maegan Shepherd is a 74 y.o. female on day 2 of admission presenting with Closed 2-part intertrochanteric fracture of proximal end of femur with delayed healing, right.    Subjective   Ms. Shepherd  right hip feels much better following surgery, less pain.  She is looking forward to getting up and working with therapy.       Objective     Physical Exam  Vitals reviewed.   HENT:      Head: Normocephalic.      Mouth/Throat:      Mouth: Mucous membranes are moist.      Pharynx: Oropharynx is clear.   Eyes:      Extraocular Movements: Extraocular movements intact.   Cardiovascular:      Rate and Rhythm: Normal rate.   Pulmonary:      Effort: Pulmonary effort is normal.   Abdominal:      Palpations: Abdomen is soft.   Musculoskeletal:      Comments: Left hip dressings are dry and intact.  light touch sensation is intact, ankle dorsiflexion/plantar flexion is intact. DP 2+/2 palpable     Skin:     General: Skin is warm and dry.      Capillary Refill: Capillary refill takes less than 2 seconds.   Neurological:      General: No focal deficit present.      Mental Status: She is alert. Mental status is at baseline.   Psychiatric:         Mood and Affect: Mood normal.         Last Recorded Vitals  Blood pressure 105/51, pulse 55, temperature 37.4 °C (99.3 °F), temperature source Temporal, resp. rate 17, height 1.651 m (5' 5\"), weight 79.9 kg (176 lb 1.6 oz), SpO2 94%.  Intake/Output last 3 Shifts:  I/O last 3 completed shifts:  In: 1577.9 (19.8 mL/kg) [P.O.:550; I.V.:927.9 (11.6 mL/kg); IV Piggyback:100]  Out: 810 (10.1 mL/kg) [Urine:800 (0.3 mL/kg/hr); Blood:10]  Weight: 79.9 kg     Relevant Results      Scheduled medications  acetaminophen, 650 mg, oral, q6h LIZY  aspirin, 81 mg, oral, Daily  carvedilol, 6.25 mg, oral, BID  docusate sodium, 100 mg, oral, BID  enoxaparin, 30 mg, subcutaneous, q24h LIZY  ferrous sulfate, 150 mg, oral, Daily with breakfast  PARoxetine, 10 mg, oral, Daily  polyethylene glycol, 17 g, oral, " Daily  rosuvastatin, 20 mg, oral, Every other day  [Held by provider] valsartan, 160 mg, oral, Daily      Continuous medications  lactated Ringer's, 50 mL/hr, Last Rate: 50 mL/hr (12/05/24 2040)  oxygen, 2 L/min      PRN medications  PRN medications: acetaminophen, benzocaine-menthol, bisacodyl, bisacodyl, diphenhydrAMINE, HYDROmorphone, HYDROmorphone, HYDROmorphone, morphine, naloxone, ondansetron **OR** ondansetron, ondansetron ODT **OR** [DISCONTINUED] ondansetron, oxyCODONE, oxyCODONE, prochlorperazine **OR** prochlorperazine **OR** prochlorperazine  Results for orders placed or performed during the hospital encounter of 12/04/24 (from the past 24 hours)   Renal Function Panel   Result Value Ref Range    Glucose 138 (H) 74 - 99 mg/dL    Sodium 135 (L) 136 - 145 mmol/L    Potassium 4.2 3.5 - 5.3 mmol/L    Chloride 104 98 - 107 mmol/L    Bicarbonate 25 21 - 32 mmol/L    Anion Gap 10 10 - 20 mmol/L    Urea Nitrogen 44 (H) 6 - 23 mg/dL    Creatinine 1.64 (H) 0.50 - 1.05 mg/dL    eGFR 32 (L) >60 mL/min/1.73m*2    Calcium 8.6 8.6 - 10.3 mg/dL    Phosphorus 4.9 2.5 - 4.9 mg/dL    Albumin 3.2 (L) 3.4 - 5.0 g/dL   Magnesium   Result Value Ref Range    Magnesium 2.13 1.60 - 2.40 mg/dL   CBC   Result Value Ref Range    WBC 13.2 (H) 4.4 - 11.3 x10*3/uL    nRBC 0.0 0.0 - 0.0 /100 WBCs    RBC 3.71 (L) 4.00 - 5.20 x10*6/uL    Hemoglobin 11.4 (L) 12.0 - 16.0 g/dL    Hematocrit 36.0 36.0 - 46.0 %    MCV 97 80 - 100 fL    MCH 30.7 26.0 - 34.0 pg    MCHC 31.7 (L) 32.0 - 36.0 g/dL    RDW 12.9 11.5 - 14.5 %    Platelets 181 150 - 450 x10*3/uL   ECG 12 Lead   Result Value Ref Range    Ventricular Rate 55 BPM    Atrial Rate 55 BPM    IL Interval 138 ms    QRS Duration 90 ms    QT Interval 464 ms    QTC Calculation(Bazett) 443 ms    P Axis 53 degrees    R Axis 43 degrees    T Axis 23 degrees    QRS Count 9 beats    Q Onset 216 ms    P Onset 147 ms    P Offset 204 ms    T Offset 448 ms    QTC Fredericia 450 ms                 ECG 12  Lead    Result Date: 12/6/2024  Sinus bradycardia Otherwise normal ECG When compared with ECG of 05-DEC-2024 08:51, (unconfirmed) No significant change was found    FL fluoro images no charge    Result Date: 12/5/2024  These images are not reportable by radiology and will not be interpreted by  Radiologists.    ECG 12 Lead    Result Date: 12/5/2024  Sinus bradycardia Possible Left atrial enlargement Borderline ECG When compared with ECG of 04-DEC-2024 15:36, QT has lengthened    Electrocardiogram, 12-lead    Result Date: 12/5/2024  Sinus bradycardia Possible Left atrial enlargement Borderline ECG When compared with ECG of 05-DEC-2024 08:50, (unconfirmed) No significant change was found    XR femur right 2+ views    Result Date: 12/5/2024  Interpreted By:  Rogerio Mckay, STUDY: XR FEMUR RIGHT 2+ VIEWS;  12/5/2024 4:18 pm   INDICATION: Signs/Symptoms:need to make sure no other right femur fractures per Dr. Holbrook.   COMPARISON: None.   ACCESSION NUMBER(S): [id number]   ORDERING CLINICIAN: KASEY BARNES   FINDINGS: Impacted intertrochanteric fracture proximally. No additional femoral fractures identified.       Proximal right femoral fracture. No additional femoral fractures identified.   MACRO: None   Signed by: Rogerio Mckay 12/5/2024 4:22 PM Dictation workstation:   [id number]    Transthoracic Echo (TTE) Limited    Result Date: 12/5/2024            [facility] [id number] [id number]    Tel [phone] Fax [phone] TRANSTHORACIC ECHOCARDIOGRAM REPORT Patient Name:       ALEX Tom Physician:    [id number] Antione Stephens MD Study Date:         12/5/2024            Ordering Provider:    [id number] ROXANNE MCKEON MRN/PID:            [id number]             Fellow: Accession#:         [id number]         Nurse: Date of Birth/Age:  1950 / 74 years  Sonographer:          Madie Lowe                                                                 RDCS Gender Assigned at  F                    Additional Staff: Birth: Height:             165.10 cm            Admit Date:           12/4/2024 Weight:             79.83 kg             Admission Status:     Inpatient -                                                                Priority                                                                discharge BSA / BMI:          1.87 m2 / 29.29      Department Location:  [facility]                     kg/m2 Blood Pressure: 147 /60 mmHg Study Type:    TRANSTHORACIC ECHO (TTE) LIMITED Diagnosis/ICD: Non ST elevation (NSTEMI) myocardial infarction-I21.4; Abnormal                electrocardiogram [ECG] [EKG]-R94.31 Indication:    NSTEMI, Abn EKG CPT Codes:     Echo Limited-[id number]  Study Detail: The following Echo studies were performed: 2D, Doppler, M-Mode and               color flow.  PHYSICIAN INTERPRETATION: Left Ventricle: There is mild to moderate eccentric left ventricular hypertrophy involving the septal wall. There are no regional wall motion abnormalities. The left ventricular cavity size is normal. There is moderately increased septal and mildly increased posterior left ventricular wall thickness. There is left ventricular hypertrophy involving the septal wall. There is left ventricular concentric remodeling. Spectral Doppler shows a Grade I (impaired relaxation pattern) of left ventricular diastolic filling with normal left atrial filling pressure. LV Wall Scoring: All segments are normal. Left Atrium: The left atrium is upper limits of normal in size. Right Ventricle: The right ventricle is normal in size. There is normal right ventricular global systolic function. Right Atrium: The right atrium is normal in size. Aortic Valve: The aortic valve is trileaflet. There is mild aortic valve thickening. There is no evidence of aortic valve stenosis. The aortic valve dimensionless index is 0.92. There is no evidence of aortic valve  regurgitation. The peak instantaneous gradient of the aortic valve is 10 mmHg. The mean gradient of the aortic valve is 6 mmHg. Mid cavitary minimal dyanamic gradient dagger shapped flow Less than 2 m/sec. Mitral Valve: The mitral valve is normal in structure. There is trace mitral valve regurgitation. Tricuspid Valve: The tricuspid valve is structurally normal. There is trace to mild tricuspid regurgitation. The Doppler estimated RVSP is within normal limits at 22.3 mmHg. Pulmonic Valve: The pulmonic valve is structurally normal. There is no indication of pulmonic valve regurgitation. Pericardium: No pericardial effusion noted. Aorta: The aortic root is normal. There is no dilatation of the ascending aorta. There is no dilatation of the aortic root. Pulmonary Artery: The main pulmonary artery is normal in size, and position, with normal bifurcation into the left and right pulmonary arteries. Systemic Veins: The hepatic vein appears to be of normal size. The inferior vena cava appears normal in size, with IVC inspiratory collapse greater than 50%. The hepatic vein shows a normal flow pattern.  CONCLUSIONS:  1. Spectral Doppler shows a Grade I (impaired relaxation pattern) of left ventricular diastolic filling with normal left atrial filling pressure.  2. There is normal right ventricular global systolic function.  3. Right ventricular systolic pressure is within normal limits.  4. Aortic valve stenosis is not present.  5. There is moderately increased septal and mildly increased posterior left ventricular wall thickness. QUANTITATIVE DATA SUMMARY:  2D MEASUREMENTS:           Normal Ranges: IVSd:            1.29 cm   (0.6-1.1cm) LVPWd:           1.04 cm   (0.6-1.1cm) LVIDd:           4.15 cm   (3.9-5.9cm) LVIDs:           2.76 cm LV Mass Index:   89.4 g/m2 LV % FS          33.4 %  LV SYSTOLIC FUNCTION BY 2D PLANIMETRY (MOD):                   Normal Ranges: EF-A4C View: 77 % (>=55%) EF-A2C View: 88 % EF-Biplane:  87  %  LV DIASTOLIC FUNCTION:            Normal Ranges: MV Peak E:             0.66 m/s   (0.7-1.2 m/s) MV Peak A:             0.60 m/s   (0.42-0.7 m/s) E/A Ratio:             1.09       (1.0-2.2) MV e'                  0.085 m/s  (>8.0) MV lateral e'          0.09 m/s MV medial e'           0.08 m/s E/e' Ratio:            7.71       (<8.0) PulmV Sys Galo:         46.09 cm/s PulmV Aguero Galo:        41.55 cm/s PulmV S/D Galo:         1.11 PulmV A Revs Galo:      16.37 cm/s PulmV A Revs Dur:      85.63 msec  MITRAL VALVE:          Normal Ranges: MV DT:        248 msec (150-240msec)  AORTIC VALVE:                      Normal Ranges: AoV Vmax:                1.58 m/s  (<=1.7m/s) AoV Peak PG:             10.0 mmHg (<20mmHg) AoV Mean P.2 mmHg  (1.7-11.5mmHg) LVOT Max Galo:            1.39 m/s  (<=1.1m/s) AoV VTI:                 42.96 cm  (18-25cm) LVOT VTI:                39.59 cm LVOT Diameter:           1.84 cm   (1.8-2.4cm) AoV Area, VTI:           2.46 cm2  (2.5-5.5cm2) AoV Area,Vmax:           2.36 cm2  (2.5-4.5cm2) AoV Dimensionless Index: 0.92  TRICUSPID VALVE/RVSP:          Normal Ranges: Peak TR Velocity:     2.20 m/s RV Syst Pressure:     22 mmHg  (< 30mmHg)  Pulmonary Veins: PulmV A Revs Dur: 85.63 msec PulmV A Revs Galo: 16.37 cm/s PulmV Aguero Galo:   41.55 cm/s PulmV S/D Galo:    1.11 PulmV Sys Galo:    46.09 cm/s  [id number] Antione Stephens MD Electronically signed on 2024 at 12:53:14 PM  Wall Scoring  ** Final **     ECG 12 Lead    Result Date: 2024  Sinus bradycardia Possible Left atrial enlargement Otherwise normal ECG When compared with ECG of 11-SEP-2020 03:39, No significant change was found Confirmed by Humphrey Lehman ([id number]) on 2024 1:05:13 PM    CT chest abdomen pelvis w IV contrast    Result Date: 2024  Interpreted By:  Elizabet Villalba, STUDY: CT CHEST ABDOMEN PELVIS W IV CONTRAST;  2024 4:13 pm   INDICATION: Signs/Symptoms:Trauma.   COMPARISON: CT abdomen and pelvis  03/19/2009   ACCESSION NUMBER(S): [id number]   ORDERING CLINICIAN: AWAIS VILLA   TECHNIQUE: CT of the chest, abdomen, and pelvis was performed. Contiguous axial images were obtained at 3 mm slice thickness through the chest, abdomen and pelvis. Coronal and sagittal reconstructions at 3 mm slice thickness were performed. 90 mL Omnipaque 350   FINDINGS: CHEST:   LUNG/PLEURA/LARGE AIRWAYS: There is no infiltrate or pleural fluid.  There is no pulmonary nodule.   VESSELS: There is enlargement of the main pulmonary outflow tract and right and left main pulmonary arteries which may be secondary to pulmonary artery hypertension.   HEART: The heart is unremarkable.   MEDIASTINUM AND JUSTINE: There is a 1.9 x 1.2 cm enlarged right hilar lymph node.   CHEST WALL AND LOWER NECK: The chest wall is unremarkable. There is no abnormality of the lower neck.   ABDOMEN:   LIVER: There is no hepatic mass.   BILE DUCTS: There is no intrahepatic, common hepatic or common bile ductal dilatation.   GALLBLADDER: Status post cholecystectomy.   PANCREAS: There is no abnormality of the pancreas.   SPLEEN: The spleen is unremarkable. There is no splenic mass. There is no splenomegaly   ADRENAL GLANDS: The adrenal glands are unremarkable.   KIDNEYS AND URETERS:. The kidneys function symmetrically. There is no renal mass. There is no intrarenal calculus or hydronephrosis.     PELVIS:   BLADDER: The bladder is unremarkable.   REPRODUCTIVE ORGANS: There is no abnormality of the reproductive organs.   BOWEL: There is no bowel wall thickening, dilatation or obstruction. There is a large amount of stool throughout the colon. The appendix is normal.   VESSELS: The abdominal and pelvic vessels are unremarkable.   PERITONEUM/RETROPERITONEUM/LYMPH NODES: There is no retroperitoneal or pelvic adenopathy.  There is no ascites.   ABDOMINAL WALL: The abdominal wall is unremarkable.   BONES AND SOFT TISSUES: There is an impacted basicervical fracture of the  right femur with 1.0 cm impaction. There is a varus deformity. This extends into the greater and lesser trochanters.   There is no soft tissue abnormality.       CHEST: 1. No acute chest trauma. 2. Enlarged right hilar lymph node.   ABDOMEN AND PELVIS: 1. Impacted basicervical fracture right femur with 1 cm impaction and varus deformity. This extends into the greater and lesser trochanters. 2. Status post cholecystectomy. 3. Constipation.   MACRO: None   Signed by: Elizabet Villalba 12/4/2024 4:42 PM Dictation workstation:   [id number]    CT lumbar spine wo IV contrast    Result Date: 12/4/2024  Interpreted By:  Elizabet Villalba, STUDY: CT LUMBAR SPINE WO IV CONTRAST  12/4/2024 4:20 pm   INDICATION: Signs/Symptoms:Trauma     COMPARISON: 08/25/2011   ACCESSION NUMBER(S): [id number]   ORDERING CLINICIAN: AWAIS VILLA   TECHNIQUE: Axial CT images of the lumbar spine are obtained. Axial, coronal and sagittal reconstructions are provided for review.   FINDINGS: There is moderate-to-marked anterior osteophytic spurring at all levels. Alignment: Within normal limits.   Vertebrae/Disc Spaces:   The vertebral body heights are intact. The disc spaces are preserved.   Lower Thoracic Spine:  There is no significant central canal stenosis in the included lower thoracic region.   T12-L1:  There is no significant central canal stenosis.   L1-2:  There is moderate ligamentum flavum and facet joint hypertrophy. There is posterior bony spondylosis extending more so to the left of midline. This causes mild central canal stenosis. The neural foramina are patent.   L2-3:  There is moderate ligamentum flavum and facet joint hypertrophy. There is no significant central canal or neural foraminal stenosis.   L3-4:  There is marked ligamentum flavum and facet joint hypertrophy with posterior bony spondylosis and diffuse disc bulge. There is marked central canal stenosis. The neural foramina are patent.   L4-5:  There is marked facet joint  hypertrophy with a diffuse disc bulge. There is no central canal stenosis or neural foraminal compromise.   L5-S1: There is marked facet joint hypertrophy. There is no central canal stenosis or neural foraminal compromise. There is no significant central canal or neural foraminal stenosis.   Prevertebral/Paraspinal Soft Tissues: The prevertebral and paraspinal soft tissues are unremarkable.       1. No acute bony abnormality lumbar spine. 2. At L1-2, there is mild central canal stenosis. 3. At L3-4, there is marked central canal stenosis.   MACRO: None   Signed by: Elizabet Villalba 12/4/2024 4:37 PM Dictation workstation:   [id number]    CT thoracic spine wo IV contrast    Result Date: 12/4/2024  Interpreted By:  Elizabet Villalba, STUDY: CT THORACIC SPINE WO IV CONTRAST;  12/4/2024 4:20 pm   INDICATION: Signs/Symptoms:Trauma.     COMPARISON: None.   ACCESSION NUMBER(S): [id number]   ORDERING CLINICIAN: AWAIS VILLA   TECHNIQUE: Axial CT images of the thoracic spine are obtained. Axial, coronal and sagittal reconstructions are submitted for review.   FINDINGS: There is moderate-to-marked anterior osteophytic spurring of the entire thoracic spine. There is a mild right convex thoracic scoliosis.   Alignment: Within normal limits.   Vertebrae/Intervertebral Discs: The thoracic vertebral body heights are intact. The disc spaces are preserved. There is no significant central canal stenosis.   Paraspinous Soft Tissues: Within normal limits.         No acute bony abnormality thoracic spine.   MACRO: None   Signed by: Elizabet Villalba 12/4/2024 4:33 PM Dictation workstation:   [id number]    CT head W O contrast trauma protocol    Result Date: 12/4/2024  Interpreted By:  Dontae Jason, STUDY: CT HEAD W/O CONTRAST TRAUMA PROTOCOL; CT CERVICAL SPINE WO IV CONTRAST;  12/4/2024 4:06 pm   INDICATION: Trauma. Signs/Symptoms:Trauma.     COMPARISON: September 10, 2020 head CT, September 11, 2020 MRI brain September 10, 2020 CT  angiography head neck   ACCESSION NUMBER(S): [id number]; [id number]   ORDERING CLINICIAN: AWAIS VILLA   TECHNIQUE: Axial noncontrast head and cervical spine CT   FINDINGS: Compared with prior examinations newly seen mild focal encephalomalacia left posterior insular region related to remote infarct.   There is no evidence of acute intra, or extra-axial fluid collection, mass, nor mass effect. There is normal gray-white differentiation.   Paranasal sinuses: No significant abnormality.   Calvarium: No evident fracture.   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   Cervical spine:   Alignment: Similar alignment including about 2 mm C3 anterolisthesis.   Cranial cervical junction: Intact.   Fracture: No acute fracture.   Vertebra and intervertebral disc spaces: Similar multilevel spondylosis and degenerative disc changes contributing to similar degree of canal and foraminal narrowing most pronounced at C5-6.   Paravertebral soft tissues: No evident hematoma. Scattered arterial vascular calcifications. There are metallic clips left neck. No there are bilateral thyroid nodules including dominant right lobe hypodense nodule image 210/14 estimated at 17 mm compared with about 14 mm September 10, 2020.   Brain Injury (BIG) guidelines CT values:   Skull fracture: No SDH (subdural hematoma): None detected EDH (epidural hemtoma): None detected IPH (intraparenchymal hemorrhage): None detected SAH (subarachnoid hemorrhage): None detected IVH (intraventricular hemorrhage): No   Reference: Doe FARFAN, Manuel RS, Titi M, et al. The BIG (brain injury guidelines) project: defining the management of traumatic brain injury by acute care surgeons. J Trauma Acute Care Surg. 2014;76:451o030.       Head: No acute intracranial abnormality was identified.   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   Cervical spine: No acute fracture or subluxation.   Similar moderate to advanced  multilevel degenerative changes contributing to canal narrowing most pronounced C5-6.   Multinodular thyroid gland including dominant nodule measuring about 17 mm right lobe compared with 14 mm 2020 examination. Recommend baseline thyroid ultrasound.   MACRO: Incidental Finding:  There are few small hypoattenuating nodules measuring equal to or greater than 1.5 cm in the thyroid gland. (**-YCF-**)   Instructions:  Further evaluation with nonemergent thyroid ultrasound. (Managing Incidental Thyroid Nodules Detected on Imaging: White Paper of the ACR Incidental Thyroid Findings Committee. Josefina Rader. et al. Journal of the American College of Radiology,Volume 12, Issue 2, 143 - 150.) [email or web address].[email or web address]     Signed by: Dontae Jason 12/4/2024 4:24 PM Dictation workstation:   [id number]    CT cervical spine wo IV contrast    Result Date: 12/4/2024  Interpreted By:  Dontae Jason, STUDY: CT HEAD W/O CONTRAST TRAUMA PROTOCOL; CT CERVICAL SPINE WO IV CONTRAST;  12/4/2024 4:06 pm   INDICATION: Trauma. Signs/Symptoms:Trauma.     COMPARISON: September 10, 2020 head CT, September 11, 2020 MRI brain September 10, 2020 CT angiography head neck   ACCESSION NUMBER(S): [id number]; [id number]   ORDERING CLINICIAN: AWAIS VILLA   TECHNIQUE: Axial noncontrast head and cervical spine CT   FINDINGS: Compared with prior examinations newly seen mild focal encephalomalacia left posterior insular region related to remote infarct.   There is no evidence of acute intra, or extra-axial fluid collection, mass, nor mass effect. There is normal gray-white differentiation.   Paranasal sinuses: No significant abnormality.   Calvarium: No evident fracture.   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   Cervical spine:   Alignment: Similar alignment including about 2 mm C3 anterolisthesis.   Cranial cervical junction: Intact.   Fracture: No acute fracture.   Vertebra and intervertebral disc spaces: Similar  multilevel spondylosis and degenerative disc changes contributing to similar degree of canal and foraminal narrowing most pronounced at C5-6.   Paravertebral soft tissues: No evident hematoma. Scattered arterial vascular calcifications. There are metallic clips left neck. No there are bilateral thyroid nodules including dominant right lobe hypodense nodule image 210/14 estimated at 17 mm compared with about 14 mm September 10, 2020.   Brain Injury (BIG) guidelines CT values:   Skull fracture: No SDH (subdural hematoma): None detected EDH (epidural hemtoma): None detected IPH (intraparenchymal hemorrhage): None detected SAH (subarachnoid hemorrhage): None detected IVH (intraventricular hemorrhage): No   Reference: Doe FARFAN, Manuel RS, Titi M, et al. The BIG (brain injury guidelines) project: defining the management of traumatic brain injury by acute care surgeons. J Trauma Acute Care Surg. 2014;76:188z248.       Head: No acute intracranial abnormality was identified.   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   Cervical spine: No acute fracture or subluxation.   Similar moderate to advanced multilevel degenerative changes contributing to canal narrowing most pronounced C5-6.   Multinodular thyroid gland including dominant nodule measuring about 17 mm right lobe compared with 14 mm 2020 examination. Recommend baseline thyroid ultrasound.   MACRO: Incidental Finding:  There are few small hypoattenuating nodules measuring equal to or greater than 1.5 cm in the thyroid gland. (**-YCF-**)   Instructions:  Further evaluation with nonemergent thyroid ultrasound. (Managing Incidental Thyroid Nodules Detected on Imaging: White Paper of the ACR Incidental Thyroid Findings Committee. Josefina Rader. et al. Journal of the American College of Radiology,Volume 12, Issue 2, 143 - 150.) [email or web address]     Signed by: Dontae Jason 12/4/2024 4:24 PM Dictation workstation:   [id number]    XR pelvis 1-2  views    Result Date: 12/4/2024  Interpreted By:  Cecil Byers, STUDY: XR PELVIS 1-2 VIEWS;  12/4/2024 3:32 pm   INDICATION: Signs/Symptoms:trauma, right hip pain.   COMPARISON: Prior exam is from 04/29/2011..   ACCESSION NUMBER(S): [id number]   ORDERING CLINICIAN: AWAIS VILLA   TECHNIQUE: Portable AP view pelvis was obtained.   FINDINGS: Advanced sclerotic arthritic changes in both SI joints with partial arthritic fusion bilaterally. Distal lumbar spine disc space narrowing with endplate osteophytosis. Bilateral hip joint spaces are preserved. No lytic or blastic destructive bone lesion. There is an acute comminuted fracture through the intertrochanteric proximal right femur with mild medial and proximal migration of the main shaft fragment relative to the main head/neck fragment, and subsequent angulation of those fragments. No dislocation. No other fracture. No opaque soft tissue foreign body. No periosteal reaction or erosion. Moderate stool in the imaged lower colon and rectum.       Acute comminuted, impacted, displaced, and angulated proximal right femoral intertrochanteric fracture. No dislocation.   MACRO: None   Signed by: Cecil Byers 12/4/2024 3:36 PM Dictation workstation:   [id number]    XR chest 1 view    Result Date: 12/4/2024  Interpreted By:  Peter Wilcox, STUDY: XR CHEST 1 VIEW;  12/4/2024 3:31 pm   INDICATION: Signs/Symptoms:trauma.   COMPARISON: Chest radiograph 09/10/2020   ACCESSION NUMBER(S): [id number]   ORDERING CLINICIAN: AWAIS VILLA   FINDINGS:     CARDIOMEDIASTINAL SILHOUETTE: Cardiomediastinal silhouette is normal in size and configuration.   LUNGS: No consolidation, pneumothorax, or significant effusion.   ABDOMEN: No remarkable upper abdominal findings.   BONES: No acute osseous changes.       1.  No evidence of acute cardiopulmonary process.     Signed by: Peter Wilcox 12/4/2024 3:33 PM Dictation workstation:   [id number]    Vascular US Carotid Artery Duplex  Bilateral    Result Date: 11/16/2024          [place] [address] Tel [phone] and Fax [phone]  Vascular Lab Report [facility] US CAROTID ARTERY DUPLEX BILATERAL  Patient Name:     ALEX MARTIN    Reading           [id number] Ajay Mckeon MD,                                      Physician:        MARIANGEL Study Date:       11/15/2024         Ordering          [id number] ANTONINO PEARCE                                      Physician: MRN/PID:          [id number]           Technologist:     Cyndi Lloyd RVT Accession#:       [id number]       Technologist 2: Date of           1950 / 74      Encounter#:       [id number] Birth/Age:        years Gender:           F Admission Status: Outpatient         Location          [facility]                                      Performed:  Diagnosis/ICD: Occlusion and stenosis of left carotid artery-I65.22 Indication:    Carotid Occlusion/Stenosis w/o infarct CPT Codes:     49171 Cerebrovascular Carotid Duplex scan complete  Patient History H/o left CEA.  CONCLUSIONS: Right Carotid: Findings are consistent with less than 50% stenosis of the right proximal internal carotid artery. Right external carotid artery appears patent with no evidence of stenosis. The right vertebral artery is patent with antegrade flow. No evidence of hemodynamically significant stenosis in the right subclavian artery. Left Carotid: Findings are consistent with less than 50% stenosis of the left proximal internal carotid artery. Left external carotid artery appears patent with no evidence of stenosis. The left vertebral artery is patent with antegrade flow. No evidence of hemodynamically significant stenosis in the left subclavian artery.  Comparison: Compared with study from 11/14/2023, no significant change.  Imaging & Doppler Findings: Right Plaque Morph: The proximal right internal carotid artery demonstrates heterogenous and calcified plaque. The right  carotid bulb demonstrates heterogenous and calcified plaque. Left Plaque Morph: The proximal left external carotid artery demonstrates heterogenous plaque.   Right                        Left   PSV      EDV                PSV       cm/s           CCA P    84 cm/s 58 cm/s            CCA D    58 cm/s 60 cm/s  12 cm/s   ICA P    44 cm/s  12 cm/s 79 cm/s  18 cm/s   ICA M    62 cm/s  20 cm/s 89 cm/s  19 cm/s   ICA D    66 cm/s  19 cm/s 112 cm/s            ECA     169 cm/s 35 cm/s  7 cm/s  Vertebral  50 cm/s  12 cm/s 130 cm/s         Subclavian 149 cm/s               Right Left ICA/CCA Ratio  1.0  0.8   [id number] Ajay Mckeon MD, RPVI Electronically signed by [id number] Ajay Mckeon MD, RPVI on 11/16/2024 at 12:11:25 PM  ** Final **               Assessment/Plan   Assessment & Plan    POD #1 s/p right TFN  Continue PT/OT, WBAT right   LE  Using incentive spirometer   Reviewed am labs  Multimodal pain regimen  Surgical hip dressing to be removed on post op day #7  VTE prophylaxis: lovenox 30mg daily X 30 days  When appropriate, will discharge home with [facility]  Follow up with Dr. Ant Segovia in 2 weeks         I spent 25 minutes in the professional and overall care of this patient.      Rosetta Winkler PA-C

## 2024-12-06 NOTE — CARE PLAN
Problem: Skin  Goal: Prevent/manage excess moisture  Outcome: Progressing     Problem: Pain - Adult  Goal: Verbalizes/displays adequate comfort level or baseline comfort level  Outcome: Progressing     Problem: Discharge Planning  Goal: Discharge to home or other facility with appropriate resources  Outcome: Progressing     Problem: Chronic Conditions and Co-morbidities  Goal: Patient's chronic conditions and co-morbidity symptoms are monitored and maintained or improved  Outcome: Progressing     Problem: Pain  Goal: Takes deep breaths with improved pain control throughout the shift  Outcome: Progressing     Problem: Fall/Injury  Goal: Not fall by end of shift  Outcome: Progressing

## 2024-12-06 NOTE — CARE PLAN
The patient's goals for the shift include  Patient will have decrease level of pain    The clinical goals for the shift include Patient will remain safe during the shift    Over the shift, the patient did not make progress toward the following goals. Barriers to progression include patient still having pain. Recommendations to address these barriers include monitor amd treat  Problem: Skin  Goal: Prevent/manage excess moisture  12/6/2024 0401 by Humphrey Gomez RN  Outcome: Progressing  12/6/2024 0401 by Humphrey Gomez RN  Outcome: Progressing     Problem: Pain - Adult  Goal: Verbalizes/displays adequate comfort level or baseline comfort level  12/6/2024 0401 by Humphrey Gomez RN  Outcome: Progressing  12/6/2024 0401 by Humphrey Gomez RN  Outcome: Progressing   .

## 2024-12-06 NOTE — DISCHARGE INSTRUCTIONS
Please call and follow up with your primary care provider (PCP) . (You have to call to confirm/ schedule this appointment)  Please call and follow up with your cardiologist (You have to call to confirm/ schedule this appointment).    Please take all of your medications as directed.     Patient admitted with a hip fracture and noted to have troponin elevation without chest pain or ischemic EKG changes. TTE done with normal ejection fraction. Cardiology consulted, recommending outpatient ischemic workup in 1-2 weeks time. Post-operatively developed mild PASCUAL. Received gentle hydration and encouraged PO intake and Valsartan held. This medication will continue to be held on discharge. A repeat RFP should be done in 1 week. If renal function improved (Cr on day of discharge 1.6) can restart.     In regards to her post-operative care, she is WBAT on the RLE, will need DVT ppx for 30d post-operatively (till 1/4/2025), and surgical dressing should remain in place until POD #7 (12/12/2024). She will need to follow up with Dr. Holbrook in 2-3 weeks time.    New medications: None     Discontinued medications: Valsartan 160mg    If you have any concerning symptoms, or worsening symptoms please call or return, such as chest pain, shortness of breath, new onset confusion, loss of sensation, or fever >103F.    Thank you for allowing us to participate in your health care.    -[facility] [place] [facility] Teaching Service.

## 2024-12-06 NOTE — PROGRESS NOTES
Endoscopy H&P    Procedure : Colonoscopy      asymptomatic screening exam      Past Medical History:   Diagnosis Date    Allergy     Degenerative disc disease     Gout     Hyperlipidemia     Hypertension     Male hypogonadism 2012     Sedation Problems: NO  Family History   Problem Relation Age of Onset    Hyperlipidemia Father     Hypertension Father     Heart disease Father     Liver disease Mother     Heart disease Brother     Heart disease Maternal Grandmother     Heart disease Paternal Grandfather      Fam Hx of Sedation Problems: NO  Social History     Socioeconomic History    Marital status:      Spouse name: Not on file    Number of children: 3    Years of education: Not on file    Highest education level: Not on file   Occupational History     Employer: [employer]   Social Needs    Financial resource strain: Not hard at all    Food insecurity     Worry: Never true     Inability: Never true    Transportation needs     Medical: No     Non-medical: No   Tobacco Use    Smoking status: Former Smoker     Quit date: 1987     Years since quittin.2    Smokeless tobacco: Never Used   Substance and Sexual Activity    Alcohol use: Yes     Alcohol/week: 7.0 standard drinks     Types: 7 Glasses of wine per week     Frequency: 2-3 times a week     Drinks per session: 1 or 2     Binge frequency: Less than monthly    Drug use: No    Sexual activity: Yes     Partners: Female   Lifestyle    Physical activity     Days per week: 3 days     Minutes per session: 50 min    Stress: Only a little   Relationships    Social connections     Talks on phone: More than three times a week     Gets together: Once a week     Attends Faith service: Not on file     Active member of club or organization: No     Attends meetings of clubs or organizations: Never     Relationship status:    Other Topics Concern    Not on file   Social History Narrative    Not on file  Maegan Shepherd is a 74 y.o. female on day 1 of admission presenting with Closed 2-part intertrochanteric fracture of proximal end of femur with delayed healing, right.      Subjective   Patient was seen and examined this morning at bedside.  No acute event overnight.  Yesterday he underwent closed reduction of an intertrochanteric hip fracture with placement of intramedullary nail.  Today patient complained of nausea, deny vomiting, fever abdominal pain.  He also denies any chest pain or shortness of breath.  She denies any pain at site of surgery, however there is some numbness at that site.     Objective     Last Recorded Vitals  /51 (BP Location: Left arm, Patient Position: Lying)   Pulse 55   Temp 37.4 °C (99.3 °F) (Temporal)   Resp 17   Wt 79.9 kg (176 lb 1.6 oz)   SpO2 94%   Intake/Output last 3 Shifts:    Intake/Output Summary (Last 24 hours) at 12/6/2024 1036  Last data filed at 12/6/2024 0400  Gross per 24 hour   Intake 1577.92 ml   Output 260 ml   Net 1317.92 ml       Admission Weight  Weight: 75.8 kg (167 lb) (12/04/24 1520)    Daily Weight  12/04/24 : 79.9 kg (176 lb 1.6 oz)    Image Results  FL fluoro images no charge  These images are not reportable by radiology and will not be interpreted   by  Radiologists.  Electrocardiogram, 12-lead  Sinus bradycardia  Possible Left atrial enlargement  Borderline ECG  When compared with ECG of 05-DEC-2024 08:50, (unconfirmed)  No significant change was found  ECG 12 Lead  Sinus bradycardia  Possible Left atrial enlargement  Borderline ECG  When compared with ECG of 04-DEC-2024 15:36,  QT has lengthened  XR femur right 2+ views  Narrative: Interpreted By:  Rogerio Mckay,   STUDY:  XR FEMUR RIGHT 2+ VIEWS;  12/5/2024 4:18 pm      INDICATION:  Signs/Symptoms:need to make sure no other right femur fractures per  Dr. Holbrook.      COMPARISON:  None.      ACCESSION NUMBER(S):  [id number]      ORDERING CLINICIAN:  KASEY BARNES      FINDINGS:  Impacted        Current Facility-Administered Medications:     0.9%  NaCl infusion, , Intravenous, Continuous, H. Bo Montenegro MD  Review of Systems - Negative    Respiratory ROS: no cough, shortness of breath, or wheezing  Cardiovascular ROS: no chest pain or dyspnea on exertion  Gastrointestinal ROS: no abdominal pain, change in bowel habits, or black or bloody stools  Musculoskeletal ROS: negative  Neurological ROS: no TIA or stroke symptoms        Physical Exam:  General: no distress  Head: normocephalic  Airway:  normal oropharynx, airway normal  Neck: supple, symmetrical, trachea midline  Lungs:  normal respiratory effort  Heart: regular rate and rhythm  Abdomen: soft, non-tender non-distented; bowel sounds normal; no masses,  no organomegaly  Extremities: no cyanosis or edema, or clubbing       Deep Sedation: Mallampati Score per anesthesia     SedationPlan :Choice     ASA : II       intertrochanteric fracture proximally. No additional femoral  fractures identified.      Impression: Proximal right femoral fracture.  No additional femoral fractures identified.      MACRO:  None      Signed by: Rogerio Mckay 12/5/2024 4:22 PM  Dictation workstation:   [id number]  Transthoracic Echo (TTE) Limited              [facility]  [address] [address] [facility], [place] [id number]     Tel [phone] Fax [phone]    TRANSTHORACIC ECHOCARDIOGRAM REPORT    Patient Name:       ALEX MARTIN        Reading Physician:    [id number] Antione Stephens MD  Study Date:         12/5/2024            Ordering Provider:    [id number] ROXANNE MCKEON  MRN/PID:            [id number]             Fellow:  Accession#:         [id number]         Nurse:  Date of Birth/Age:  1950 / 74 years  Sonographer:          Madie Lowe RDCS  Gender Assigned at  F                    Additional Staff:  Birth:  Height:             165.10 cm            Admit Date:           12/4/2024  Weight:             79.83 kg             Admission Status:     Inpatient -                                                                 Priority                                                                 discharge  BSA / BMI:          1.87 m2 / 29.29      Department Location:  [facility] Echo Lab                      kg/m2  Blood Pressure: 147 /60 mmHg    Study Type:    TRANSTHORACIC ECHO (TTE) LIMITED  Diagnosis/ICD: Non ST elevation (NSTEMI) myocardial infarction-I21.4; Abnormal                 electrocardiogram [ECG] [EKG]-R94.31  Indication:    NSTEMI, Abn EKG  CPT Codes:     Echo Limited-87497   Study Detail: The following Echo studies were performed: 2D, Doppler, M-Mode and                color flow.       PHYSICIAN INTERPRETATION:  Left Ventricle: There is mild to moderate eccentric left ventricular hypertrophy involving the septal  wall. There are no regional wall motion abnormalities. The left ventricular cavity size is normal. There is moderately increased septal and mildly increased posterior left ventricular wall thickness. There is left ventricular hypertrophy involving the septal wall. There is left ventricular concentric remodeling. Spectral Doppler shows a Grade I (impaired relaxation pattern) of left ventricular diastolic filling with normal left atrial filling pressure.  LV Wall Scoring:  All segments are normal.    Left Atrium: The left atrium is upper limits of normal in size.  Right Ventricle: The right ventricle is normal in size. There is normal right ventricular global systolic function.  Right Atrium: The right atrium is normal in size.  Aortic Valve: The aortic valve is trileaflet. There is mild aortic valve thickening. There is no evidence of aortic valve stenosis.  The aortic valve dimensionless index is 0.92. There is no evidence of aortic valve regurgitation. The peak instantaneous gradient of the aortic valve is 10 mmHg. The mean gradient of the aortic valve is 6 mmHg. Mid cavitary minimal dyanamic gradient dagger shapped flow Less than 2 m/sec.  Mitral Valve: The mitral valve is normal in structure. There is trace mitral valve regurgitation.  Tricuspid Valve: The tricuspid valve is structurally normal. There is trace to mild tricuspid regurgitation. The Doppler estimated RVSP is within normal limits at 22.3 mmHg.  Pulmonic Valve: The pulmonic valve is structurally normal. There is no indication of pulmonic valve regurgitation.  Pericardium: No pericardial effusion noted.  Aorta: The aortic root is normal. There is no dilatation of the ascending aorta. There is no dilatation of the aortic root.  Pulmonary Artery: The main pulmonary artery is normal in size, and position, with normal bifurcation into the left and right pulmonary arteries.  Systemic Veins: The hepatic vein appears to be of normal size. The inferior vena  cava appears normal in size, with IVC inspiratory collapse greater than 50%. The hepatic vein shows a normal flow pattern.       CONCLUSIONS:   1. Spectral Doppler shows a Grade I (impaired relaxation pattern) of left ventricular diastolic filling with normal left atrial filling pressure.   2. There is normal right ventricular global systolic function.   3. Right ventricular systolic pressure is within normal limits.   4. Aortic valve stenosis is not present.   5. There is moderately increased septal and mildly increased posterior left ventricular wall thickness.    QUANTITATIVE DATA SUMMARY:     2D MEASUREMENTS:           Normal Ranges:  IVSd:            1.29 cm   (0.6-1.1cm)  LVPWd:           1.04 cm   (0.6-1.1cm)  LVIDd:           4.15 cm   (3.9-5.9cm)  LVIDs:           2.76 cm  LV Mass Index:   89.4 g/m2  LV % FS          33.4 %       LV SYSTOLIC FUNCTION BY 2D PLANIMETRY (MOD):                    Normal Ranges:  EF-A4C View: 77 % (>=55%)  EF-A2C View: 88 %  EF-Biplane:  87 %       LV DIASTOLIC FUNCTION:            Normal Ranges:  MV Peak E:             0.66 m/s   (0.7-1.2 m/s)  MV Peak A:             0.60 m/s   (0.42-0.7 m/s)  E/A Ratio:             1.09       (1.0-2.2)  MV e'                  0.085 m/s  (>8.0)  MV lateral e'          0.09 m/s  MV medial e'           0.08 m/s  E/e' Ratio:            7.71       (<8.0)  PulmV Sys Galo:         46.09 cm/s  PulmV Aguero Galo:        41.55 cm/s  PulmV S/D Galo:         1.11  PulmV A Revs Galo:      16.37 cm/s  PulmV A Revs Dur:      85.63 msec       MITRAL VALVE:          Normal Ranges:  MV DT:        248 msec (150-240msec)       AORTIC VALVE:                      Normal Ranges:  AoV Vmax:                1.58 m/s  (<=1.7m/s)  AoV Peak PG:             10.0 mmHg (<20mmHg)  AoV Mean P.2 mmHg  (1.7-11.5mmHg)  LVOT Max Galo:            1.39 m/s  (<=1.1m/s)  AoV VTI:                 42.96 cm  (18-25cm)  LVOT VTI:                39.59 cm  LVOT Diameter:            1.84 cm   (1.8-2.4cm)  AoV Area, VTI:           2.46 cm2  (2.5-5.5cm2)  AoV Area,Vmax:           2.36 cm2  (2.5-4.5cm2)  AoV Dimensionless Index: 0.92       TRICUSPID VALVE/RVSP:          Normal Ranges:  Peak TR Velocity:     2.20 m/s  RV Syst Pressure:     22 mmHg  (< 30mmHg)       Pulmonary Veins:  PulmV A Revs Dur: 85.63 msec  PulmV A Revs Galo: 16.37 cm/s  PulmV Aguero Galo:   41.55 cm/s  PulmV S/D Galo:    1.11  PulmV Sys Galo:    46.09 cm/s       [id number] Antione Stephens MD  Electronically signed on 12/5/2024 at 12:53:14 PM       Wall Scoring       ** Final **  ECG 12 Lead  Sinus bradycardia  Possible Left atrial enlargement  Otherwise normal ECG  When compared with ECG of 11-SEP-2020 03:39,  No significant change was found  Confirmed by Humphrey Lehman ([id number]) on 12/5/2024 1:05:13 PM      Physical Exam  Constitutional:       Appearance: Normal appearance.   HENT:      Head: Normocephalic and atraumatic.      Mouth/Throat:      Mouth: Mucous membranes are moist.   Cardiovascular:      Rate and Rhythm: Normal rate.      Heart sounds: No murmur heard.     No gallop.   Pulmonary:      Effort: No respiratory distress.      Breath sounds: Normal breath sounds. No wheezing.   Abdominal:      General: There is no distension.      Palpations: Abdomen is soft.      Tenderness: There is no abdominal tenderness.   Musculoskeletal:      Right lower leg: No edema.      Left lower leg: No edema.      Comments: Pedal pulse was palpable bilaterally   Skin:     General: Skin is warm.   Neurological:      Mental Status: She is alert and oriented to person, place, and time.      Cranial Nerves: No cranial nerve deficit.      Motor: No weakness.   Psychiatric:         Mood and Affect: Mood normal.       Relevant Results  Scheduled medications  acetaminophen, 650 mg, oral, q6h LIZY  aspirin, 81 mg, oral, Daily  carvedilol, 6.25 mg, oral, BID  docusate sodium, 100 mg, oral, BID  enoxaparin, 30 mg, subcutaneous, q24h LIZY  ferrous sulfate, 150  mg, oral, Daily with breakfast  PARoxetine, 10 mg, oral, Daily  polyethylene glycol, 17 g, oral, Daily  rosuvastatin, 20 mg, oral, Every other day  [Held by provider] valsartan, 160 mg, oral, Daily      Continuous medications  lactated Ringer's, 50 mL/hr, Last Rate: 50 mL/hr (12/05/24 2040)  oxygen, 2 L/min      PRN medications  PRN medications: acetaminophen, benzocaine-menthol, bisacodyl, bisacodyl, diphenhydrAMINE, HYDROmorphone, HYDROmorphone, HYDROmorphone, morphine, naloxone, ondansetron **OR** ondansetron, ondansetron ODT **OR** [DISCONTINUED] ondansetron, oxyCODONE, oxyCODONE, prochlorperazine **OR** prochlorperazine **OR** prochlorperazine  Results for orders placed or performed during the hospital encounter of 12/04/24 (from the past 24 hours)   Transthoracic Echo (TTE) Limited   Result Value Ref Range    AV pk joseluis 1.58 m/s    LV Biplane EF 87 %    LVOT diam 1.84 cm    MV E/A ratio 1.09     AV mn grad 6 mmHg    RVSP 22.3 mmHg    LVIDd 4.15 cm    Aortic Valve Area by Continuity of Peak Velocity 2.36 cm2    AV pk grad 10 mmHg    Aortic Valve Area by Continuity of VTI 2.46 cm2    LV A4C EF 77.0     BSA 1.91 m2   Renal Function Panel   Result Value Ref Range    Glucose 138 (H) 74 - 99 mg/dL    Sodium 135 (L) 136 - 145 mmol/L    Potassium 4.2 3.5 - 5.3 mmol/L    Chloride 104 98 - 107 mmol/L    Bicarbonate 25 21 - 32 mmol/L    Anion Gap 10 10 - 20 mmol/L    Urea Nitrogen 44 (H) 6 - 23 mg/dL    Creatinine 1.64 (H) 0.50 - 1.05 mg/dL    eGFR 32 (L) >60 mL/min/1.73m*2    Calcium 8.6 8.6 - 10.3 mg/dL    Phosphorus 4.9 2.5 - 4.9 mg/dL    Albumin 3.2 (L) 3.4 - 5.0 g/dL   Magnesium   Result Value Ref Range    Magnesium 2.13 1.60 - 2.40 mg/dL   CBC   Result Value Ref Range    WBC 13.2 (H) 4.4 - 11.3 x10*3/uL    nRBC 0.0 0.0 - 0.0 /100 WBCs    RBC 3.71 (L) 4.00 - 5.20 x10*6/uL    Hemoglobin 11.4 (L) 12.0 - 16.0 g/dL    Hematocrit 36.0 36.0 - 46.0 %    MCV 97 80 - 100 fL    MCH 30.7 26.0 - 34.0 pg    MCHC 31.7 (L) 32.0 -  36.0 g/dL    RDW 12.9 11.5 - 14.5 %    Platelets 181 150 - 450 x10*3/uL     FL fluoro images no charge    Result Date: 12/5/2024  These images are not reportable by radiology and will not be interpreted by  Radiologists.    ECG 12 Lead    Result Date: 12/5/2024  Sinus bradycardia Possible Left atrial enlargement Borderline ECG When compared with ECG of 04-DEC-2024 15:36, QT has lengthened    Electrocardiogram, 12-lead    Result Date: 12/5/2024  Sinus bradycardia Possible Left atrial enlargement Borderline ECG When compared with ECG of 05-DEC-2024 08:50, (unconfirmed) No significant change was found    XR femur right 2+ views    Result Date: 12/5/2024  Interpreted By:  Rogerio Mckay, STUDY: XR FEMUR RIGHT 2+ VIEWS;  12/5/2024 4:18 pm   INDICATION: Signs/Symptoms:need to make sure no other right femur fractures per Dr. Holbrook.   COMPARISON: None.   ACCESSION NUMBER(S): [id number]   ORDERING CLINICIAN: KASEY BARNES   FINDINGS: Impacted intertrochanteric fracture proximally. No additional femoral fractures identified.       Proximal right femoral fracture. No additional femoral fractures identified.   MACRO: None   Signed by: Rogerio Mckay 12/5/2024 4:22 PM Dictation workstation:   [id number]    Transthoracic Echo (TTE) Limited    Result Date: 12/5/2024            [facility] [address] [facility] [id number]    Tel [phone] Fax [phone] TRANSTHORACIC ECHOCARDIOGRAM REPORT Patient Name:       ALEX MARTIN        Reading Physician:    [id number] Antione Stephens MD Study Date:         12/5/2024            Ordering Provider:    [id number] ROXANNE MCKEON MRN/PID:            [id number]             Fellow: Accession#:         [id number]         Nurse: Date of Birth/Age:  1950 / 74 years  Sonographer:          Madie Lowe RDCS Gender Assigned at  F                    Additional  Staff: Birth: Height:             165.10 cm            Admit Date:           12/4/2024 Weight:             79.83 kg             Admission Status:     Inpatient -                                                                Priority                                                                discharge BSA / BMI:          1.87 m2 / 29.29      Department Location:  [facility] Echo Lab                     kg/m2 Blood Pressure: 147 /60 mmHg Study Type:    TRANSTHORACIC ECHO (TTE) LIMITED Diagnosis/ICD: Non ST elevation (NSTEMI) myocardial infarction-I21.4; Abnormal                electrocardiogram [ECG] [EKG]-R94.31 Indication:    NSTEMI, Abn EKG CPT Codes:     Echo Limited-70340  Study Detail: The following Echo studies were performed: 2D, Doppler, M-Mode and               color flow.  PHYSICIAN INTERPRETATION: Left Ventricle: There is mild to moderate eccentric left ventricular hypertrophy involving the septal wall. There are no regional wall motion abnormalities. The left ventricular cavity size is normal. There is moderately increased septal and mildly increased posterior left ventricular wall thickness. There is left ventricular hypertrophy involving the septal wall. There is left ventricular concentric remodeling. Spectral Doppler shows a Grade I (impaired relaxation pattern) of left ventricular diastolic filling with normal left atrial filling pressure. LV Wall Scoring: All segments are normal. Left Atrium: The left atrium is upper limits of normal in size. Right Ventricle: The right ventricle is normal in size. There is normal right ventricular global systolic function. Right Atrium: The right atrium is normal in size. Aortic Valve: The aortic valve is trileaflet. There is mild aortic valve thickening. There is no evidence of aortic valve stenosis. The aortic valve dimensionless index is 0.92. There is no evidence of aortic valve regurgitation. The peak instantaneous gradient of the aortic valve is 10 mmHg. The  mean gradient of the aortic valve is 6 mmHg. Mid cavitary minimal dyanamic gradient dagger shapped flow Less than 2 m/sec. Mitral Valve: The mitral valve is normal in structure. There is trace mitral valve regurgitation. Tricuspid Valve: The tricuspid valve is structurally normal. There is trace to mild tricuspid regurgitation. The Doppler estimated RVSP is within normal limits at 22.3 mmHg. Pulmonic Valve: The pulmonic valve is structurally normal. There is no indication of pulmonic valve regurgitation. Pericardium: No pericardial effusion noted. Aorta: The aortic root is normal. There is no dilatation of the ascending aorta. There is no dilatation of the aortic root. Pulmonary Artery: The main pulmonary artery is normal in size, and position, with normal bifurcation into the left and right pulmonary arteries. Systemic Veins: The hepatic vein appears to be of normal size. The inferior vena cava appears normal in size, with IVC inspiratory collapse greater than 50%. The hepatic vein shows a normal flow pattern.  CONCLUSIONS:  1. Spectral Doppler shows a Grade I (impaired relaxation pattern) of left ventricular diastolic filling with normal left atrial filling pressure.  2. There is normal right ventricular global systolic function.  3. Right ventricular systolic pressure is within normal limits.  4. Aortic valve stenosis is not present.  5. There is moderately increased septal and mildly increased posterior left ventricular wall thickness. QUANTITATIVE DATA SUMMARY:  2D MEASUREMENTS:           Normal Ranges: IVSd:            1.29 cm   (0.6-1.1cm) LVPWd:           1.04 cm   (0.6-1.1cm) LVIDd:           4.15 cm   (3.9-5.9cm) LVIDs:           2.76 cm LV Mass Index:   89.4 g/m2 LV % FS          33.4 %  LV SYSTOLIC FUNCTION BY 2D PLANIMETRY (MOD):                   Normal Ranges: EF-A4C View: 77 % (>=55%) EF-A2C View: 88 % EF-Biplane:  87 %  LV DIASTOLIC FUNCTION:            Normal Ranges: MV Peak E:             0.66 m/s    (0.7-1.2 m/s) MV Peak A:             0.60 m/s   (0.42-0.7 m/s) E/A Ratio:             1.09       (1.0-2.2) MV e'                  0.085 m/s  (>8.0) MV lateral e'          0.09 m/s MV medial e'           0.08 m/s E/e' Ratio:            7.71       (<8.0) PulmV Sys Aglo:         46.09 cm/s PulmV Aguero Galo:        41.55 cm/s PulmV S/D Galo:         1.11 PulmV A Revs Galo:      16.37 cm/s PulmV A Revs Dur:      85.63 msec  MITRAL VALVE:          Normal Ranges: MV DT:        248 msec (150-240msec)  AORTIC VALVE:                      Normal Ranges: AoV Vmax:                1.58 m/s  (<=1.7m/s) AoV Peak PG:             10.0 mmHg (<20mmHg) AoV Mean P.2 mmHg  (1.7-11.5mmHg) LVOT Max Galo:            1.39 m/s  (<=1.1m/s) AoV VTI:                 42.96 cm  (18-25cm) LVOT VTI:                39.59 cm LVOT Diameter:           1.84 cm   (1.8-2.4cm) AoV Area, VTI:           2.46 cm2  (2.5-5.5cm2) AoV Area,Vmax:           2.36 cm2  (2.5-4.5cm2) AoV Dimensionless Index: 0.92  TRICUSPID VALVE/RVSP:          Normal Ranges: Peak TR Velocity:     2.20 m/s RV Syst Pressure:     22 mmHg  (< 30mmHg)  Pulmonary Veins: PulmV A Revs Dur: 85.63 msec PulmV A Revs Galo: 16.37 cm/s PulmV Aguero Galo:   41.55 cm/s PulmV S/D Galo:    1.11 PulmV Sys Galo:    46.09 cm/s  [id number] Antione Stephens MD Electronically signed on 2024 at 12:53:14 PM  Wall Scoring  ** Final **     ECG 12 Lead    Result Date: 2024  Sinus bradycardia Possible Left atrial enlargement Otherwise normal ECG When compared with ECG of 11-SEP-2020 03:39, No significant change was found Confirmed by Humphrey Lehman ([id number]) on 2024 1:05:13 PM    CT chest abdomen pelvis w IV contrast    Result Date: 2024  Interpreted By:  Elizabet Villalba, STUDY: CT CHEST ABDOMEN PELVIS W IV CONTRAST;  2024 4:13 pm   INDICATION: Signs/Symptoms:Trauma.   COMPARISON: CT abdomen and pelvis 2009   ACCESSION NUMBER(S): [id number]   ORDERING CLINICIAN: AWIAS VILLA    TECHNIQUE: CT of the chest, abdomen, and pelvis was performed. Contiguous axial images were obtained at 3 mm slice thickness through the chest, abdomen and pelvis. Coronal and sagittal reconstructions at 3 mm slice thickness were performed. 90 mL Omnipaque 350   FINDINGS: CHEST:   LUNG/PLEURA/LARGE AIRWAYS: There is no infiltrate or pleural fluid.  There is no pulmonary nodule.   VESSELS: There is enlargement of the main pulmonary outflow tract and right and left main pulmonary arteries which may be secondary to pulmonary artery hypertension.   HEART: The heart is unremarkable.   MEDIASTINUM AND JUSTINE: There is a 1.9 x 1.2 cm enlarged right hilar lymph node.   CHEST WALL AND LOWER NECK: The chest wall is unremarkable. There is no abnormality of the lower neck.   ABDOMEN:   LIVER: There is no hepatic mass.   BILE DUCTS: There is no intrahepatic, common hepatic or common bile ductal dilatation.   GALLBLADDER: Status post cholecystectomy.   PANCREAS: There is no abnormality of the pancreas.   SPLEEN: The spleen is unremarkable. There is no splenic mass. There is no splenomegaly   ADRENAL GLANDS: The adrenal glands are unremarkable.   KIDNEYS AND URETERS:. The kidneys function symmetrically. There is no renal mass. There is no intrarenal calculus or hydronephrosis.     PELVIS:   BLADDER: The bladder is unremarkable.   REPRODUCTIVE ORGANS: There is no abnormality of the reproductive organs.   BOWEL: There is no bowel wall thickening, dilatation or obstruction. There is a large amount of stool throughout the colon. The appendix is normal.   VESSELS: The abdominal and pelvic vessels are unremarkable.   PERITONEUM/RETROPERITONEUM/LYMPH NODES: There is no retroperitoneal or pelvic adenopathy.  There is no ascites.   ABDOMINAL WALL: The abdominal wall is unremarkable.   BONES AND SOFT TISSUES: There is an impacted basicervical fracture of the right femur with 1.0 cm impaction. There is a varus deformity. This extends into the  greater and lesser trochanters.   There is no soft tissue abnormality.       CHEST: 1. No acute chest trauma. 2. Enlarged right hilar lymph node.   ABDOMEN AND PELVIS: 1. Impacted basicervical fracture right femur with 1 cm impaction and varus deformity. This extends into the greater and lesser trochanters. 2. Status post cholecystectomy. 3. Constipation.   MACRO: None   Signed by: Elizabet Villalba 12/4/2024 4:42 PM Dictation workstation:   [id number]    CT lumbar spine wo IV contrast    Result Date: 12/4/2024  Interpreted By:  Elizabet Villalba, STUDY: CT LUMBAR SPINE WO IV CONTRAST  12/4/2024 4:20 pm   INDICATION: Signs/Symptoms:Trauma     COMPARISON: 08/25/2011   ACCESSION NUMBER(S): [id number]   ORDERING CLINICIAN: AWAIS VILLA   TECHNIQUE: Axial CT images of the lumbar spine are obtained. Axial, coronal and sagittal reconstructions are provided for review.   FINDINGS: There is moderate-to-marked anterior osteophytic spurring at all levels. Alignment: Within normal limits.   Vertebrae/Disc Spaces:   The vertebral body heights are intact. The disc spaces are preserved.   Lower Thoracic Spine:  There is no significant central canal stenosis in the included lower thoracic region.   T12-L1:  There is no significant central canal stenosis.   L1-2:  There is moderate ligamentum flavum and facet joint hypertrophy. There is posterior bony spondylosis extending more so to the left of midline. This causes mild central canal stenosis. The neural foramina are patent.   L2-3:  There is moderate ligamentum flavum and facet joint hypertrophy. There is no significant central canal or neural foraminal stenosis.   L3-4:  There is marked ligamentum flavum and facet joint hypertrophy with posterior bony spondylosis and diffuse disc bulge. There is marked central canal stenosis. The neural foramina are patent.   L4-5:  There is marked facet joint hypertrophy with a diffuse disc bulge. There is no central canal stenosis or neural  foraminal compromise.   L5-S1: There is marked facet joint hypertrophy. There is no central canal stenosis or neural foraminal compromise. There is no significant central canal or neural foraminal stenosis.   Prevertebral/Paraspinal Soft Tissues: The prevertebral and paraspinal soft tissues are unremarkable.       1. No acute bony abnormality lumbar spine. 2. At L1-2, there is mild central canal stenosis. 3. At L3-4, there is marked central canal stenosis.   MACRO: None   Signed by: Elizabet Villalba 12/4/2024 4:37 PM Dictation workstation:   [id number]    CT thoracic spine wo IV contrast    Result Date: 12/4/2024  Interpreted By:  Elizabet Villalba, STUDY: CT THORACIC SPINE WO IV CONTRAST;  12/4/2024 4:20 pm   INDICATION: Signs/Symptoms:Trauma.     COMPARISON: None.   ACCESSION NUMBER(S): [id number]   ORDERING CLINICIAN: AWAIS VILLA   TECHNIQUE: Axial CT images of the thoracic spine are obtained. Axial, coronal and sagittal reconstructions are submitted for review.   FINDINGS: There is moderate-to-marked anterior osteophytic spurring of the entire thoracic spine. There is a mild right convex thoracic scoliosis.   Alignment: Within normal limits.   Vertebrae/Intervertebral Discs: The thoracic vertebral body heights are intact. The disc spaces are preserved. There is no significant central canal stenosis.   Paraspinous Soft Tissues: Within normal limits.         No acute bony abnormality thoracic spine.   MACRO: None   Signed by: Elizabet Villalba 12/4/2024 4:33 PM Dictation workstation:   [id number]    CT head W O contrast trauma protocol    Result Date: 12/4/2024  Interpreted By:  Dontae Jason, STUDY: CT HEAD W/O CONTRAST TRAUMA PROTOCOL; CT CERVICAL SPINE WO IV CONTRAST;  12/4/2024 4:06 pm   INDICATION: Trauma. Signs/Symptoms:Trauma.     COMPARISON: September 10, 2020 head CT, September 11, 2020 MRI brain September 10, 2020 CT angiography head neck   ACCESSION NUMBER(S): [id number]; [id number]   ORDERING  CLINICIAN: AWAIS VILLA   TECHNIQUE: Axial noncontrast head and cervical spine CT   FINDINGS: Compared with prior examinations newly seen mild focal encephalomalacia left posterior insular region related to remote infarct.   There is no evidence of acute intra, or extra-axial fluid collection, mass, nor mass effect. There is normal gray-white differentiation.   Paranasal sinuses: No significant abnormality.   Calvarium: No evident fracture.   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   Cervical spine:   Alignment: Similar alignment including about 2 mm C3 anterolisthesis.   Cranial cervical junction: Intact.   Fracture: No acute fracture.   Vertebra and intervertebral disc spaces: Similar multilevel spondylosis and degenerative disc changes contributing to similar degree of canal and foraminal narrowing most pronounced at C5-6.   Paravertebral soft tissues: No evident hematoma. Scattered arterial vascular calcifications. There are metallic clips left neck. No there are bilateral thyroid nodules including dominant right lobe hypodense nodule image 210/14 estimated at 17 mm compared with about 14 mm September 10, 2020.   Brain Injury (BIG) guidelines CT values:   Skull fracture: No SDH (subdural hematoma): None detected EDH (epidural hemtoma): None detected IPH (intraparenchymal hemorrhage): None detected SAH (subarachnoid hemorrhage): None detected IVH (intraventricular hemorrhage): No   Reference: Doe B, Manuel RS, Titi M, et al. The BIG (brain injury guidelines) project: defining the management of traumatic brain injury by acute care surgeons. J Trauma Acute Care Surg. 2014;76:347h033.       Head: No acute intracranial abnormality was identified.   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   Cervical spine: No acute fracture or subluxation.   Similar moderate to advanced multilevel degenerative changes contributing to canal narrowing most pronounced C5-6.    Multinodular thyroid gland including dominant nodule measuring about 17 mm right lobe compared with 14 mm 2020 examination. Recommend baseline thyroid ultrasound.   MACRO: Incidental Finding:  There are few small hypoattenuating nodules measuring equal to or greater than 1.5 cm in the thyroid gland. (**-YCF-**)   Instructions:  Further evaluation with nonemergent thyroid ultrasound. (Managing Incidental Thyroid Nodules Detected on Imaging: White Paper of the ACR Incidental Thyroid Findings Committee. Josefina Rader. et al. Journal of the American College of Radiology,Volume 12, Issue 2, 143 - 150.) [email or web address].[email or web address].4     Signed by: Dontae Jason 12/4/2024 4:24 PM Dictation workstation:   [id number]    CT cervical spine wo IV contrast    Result Date: 12/4/2024  Interpreted By:  Dontae Jason, STUDY: CT HEAD W/O CONTRAST TRAUMA PROTOCOL; CT CERVICAL SPINE WO IV CONTRAST;  12/4/2024 4:06 pm   INDICATION: Trauma. Signs/Symptoms:Trauma.     COMPARISON: September 10, 2020 head CT, September 11, 2020 MRI brain September 10, 2020 CT angiography head neck   ACCESSION NUMBER(S): [id number]; [id number]   ORDERING CLINICIAN: AWAIS VILLA   TECHNIQUE: Axial noncontrast head and cervical spine CT   FINDINGS: Compared with prior examinations newly seen mild focal encephalomalacia left posterior insular region related to remote infarct.   There is no evidence of acute intra, or extra-axial fluid collection, mass, nor mass effect. There is normal gray-white differentiation.   Paranasal sinuses: No significant abnormality.   Calvarium: No evident fracture.   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   Cervical spine:   Alignment: Similar alignment including about 2 mm C3 anterolisthesis.   Cranial cervical junction: Intact.   Fracture: No acute fracture.   Vertebra and intervertebral disc spaces: Similar multilevel spondylosis and degenerative disc changes contributing to similar degree of  canal and foraminal narrowing most pronounced at C5-6.   Paravertebral soft tissues: No evident hematoma. Scattered arterial vascular calcifications. There are metallic clips left neck. No there are bilateral thyroid nodules including dominant right lobe hypodense nodule image 210/14 estimated at 17 mm compared with about 14 mm September 10, 2020.   Brain Injury (BIG) guidelines CT values:   Skull fracture: No SDH (subdural hematoma): None detected EDH (epidural hemtoma): None detected IPH (intraparenchymal hemorrhage): None detected SAH (subarachnoid hemorrhage): None detected IVH (intraventricular hemorrhage): No   Reference: Doe FARFAN, Manuel RS, Titi M, et al. The BIG (brain injury guidelines) project: defining the management of traumatic brain injury by acute care surgeons. J Trauma Acute Care Surg. 2014;76:834v445.       Head: No acute intracranial abnormality was identified.   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   Cervical spine: No acute fracture or subluxation.   Similar moderate to advanced multilevel degenerative changes contributing to canal narrowing most pronounced C5-6.   Multinodular thyroid gland including dominant nodule measuring about 17 mm right lobe compared with 14 mm 2020 examination. Recommend baseline thyroid ultrasound.   MACRO: Incidental Finding:  There are few small hypoattenuating nodules measuring equal to or greater than 1.5 cm in the thyroid gland. (**-YCF-**)   Instructions:  Further evaluation with nonemergent thyroid ultrasound. (Managing Incidental Thyroid Nodules Detected on Imaging: White Paper of the ACR Incidental Thyroid Findings Committee. Josefina Rader. et al. Journal of the American College of Radiology,Volume 12, Issue 2, 143 - 150.) THYROID.[email or web address].4     Signed by: Dontae Jason 12/4/2024 4:24 PM Dictation workstation:   [id number]    XR pelvis 1-2 views    Result Date: 12/4/2024  Interpreted By:  Cecil Byers, STUDY: XR PELVIS 1-2 VIEWS;   12/4/2024 3:32 pm   INDICATION: Signs/Symptoms:trauma, right hip pain.   COMPARISON: Prior exam is from 04/29/2011..   ACCESSION NUMBER(S): [id number]   ORDERING CLINICIAN: AWAIS VILLA   TECHNIQUE: Portable AP view pelvis was obtained.   FINDINGS: Advanced sclerotic arthritic changes in both SI joints with partial arthritic fusion bilaterally. Distal lumbar spine disc space narrowing with endplate osteophytosis. Bilateral hip joint spaces are preserved. No lytic or blastic destructive bone lesion. There is an acute comminuted fracture through the intertrochanteric proximal right femur with mild medial and proximal migration of the main shaft fragment relative to the main head/neck fragment, and subsequent angulation of those fragments. No dislocation. No other fracture. No opaque soft tissue foreign body. No periosteal reaction or erosion. Moderate stool in the imaged lower colon and rectum.       Acute comminuted, impacted, displaced, and angulated proximal right femoral intertrochanteric fracture. No dislocation.   MACRO: None   Signed by: Cecil Byers 12/4/2024 3:36 PM Dictation workstation:   [id number]    XR chest 1 view    Result Date: 12/4/2024  Interpreted By:  Peter Wilcox, STUDY: XR CHEST 1 VIEW;  12/4/2024 3:31 pm   INDICATION: Signs/Symptoms:trauma.   COMPARISON: Chest radiograph 09/10/2020   ACCESSION NUMBER(S): [id number]   ORDERING CLINICIAN: AWAIS VILLA   FINDINGS:     CARDIOMEDIASTINAL SILHOUETTE: Cardiomediastinal silhouette is normal in size and configuration.   LUNGS: No consolidation, pneumothorax, or significant effusion.   ABDOMEN: No remarkable upper abdominal findings.   BONES: No acute osseous changes.       1.  No evidence of acute cardiopulmonary process.     Signed by: Peter Wilcox 12/4/2024 3:33 PM Dictation workstation:   [id number]    Vascular US Carotid Artery Duplex Bilateral    Result Date: 11/16/2024          [address]  [address] Tel [phone] and Fax [phone]  Vascular Lab Report [facility] US CAROTID ARTERY DUPLEX BILATERAL  Patient Name:     ALEX MARTIN    Reading           [id number] Ajay Mckeon MD,                                      Physician:        MARIANGEL Study Date:       11/15/2024         Ordering          [id number] ANTONINO PEARCE                                      Physician: MRN/PID:          [id number]           Technologist:     Cyndi Lloyd RVT Accession#:       [id number]       Technologist 2: Date of           1950 / 74      Encounter#:       [id number] Birth/Age:        years Gender:           F Admission Status: Outpatient         Location          [facility]                                      Performed:  Diagnosis/ICD: Occlusion and stenosis of left carotid artery-I65.22 Indication:    Carotid Occlusion/Stenosis w/o infarct CPT Codes:     09330 Cerebrovascular Carotid Duplex scan complete  Patient History H/o left CEA.  CONCLUSIONS: Right Carotid: Findings are consistent with less than 50% stenosis of the right proximal internal carotid artery. Right external carotid artery appears patent with no evidence of stenosis. The right vertebral artery is patent with antegrade flow. No evidence of hemodynamically significant stenosis in the right subclavian artery. Left Carotid: Findings are consistent with less than 50% stenosis of the left proximal internal carotid artery. Left external carotid artery appears patent with no evidence of stenosis. The left vertebral artery is patent with antegrade flow. No evidence of hemodynamically significant stenosis in the left subclavian artery.  Comparison: Compared with study from 11/14/2023, no significant change.  Imaging & Doppler Findings: Right Plaque Morph: The proximal right internal carotid artery demonstrates heterogenous and calcified plaque. The right carotid bulb demonstrates heterogenous and calcified plaque. Left Plaque Morph: The proximal left external  carotid artery demonstrates heterogenous plaque.   Right                        Left   PSV      EDV                PSV       cm/s           CCA P    84 cm/s 58 cm/s            CCA D    58 cm/s 60 cm/s  12 cm/s   ICA P    44 cm/s  12 cm/s 79 cm/s  18 cm/s   ICA M    62 cm/s  20 cm/s 89 cm/s  19 cm/s   ICA D    66 cm/s  19 cm/s 112 cm/s            ECA     169 cm/s 35 cm/s  7 cm/s  Vertebral  50 cm/s  12 cm/s 130 cm/s         Subclavian 149 cm/s               Right Left ICA/CCA Ratio  1.0  0.8   [id number] Ajay Mckeon MD, RPVI Electronically signed by [id number] Ajay Mckeon MD, RPVI on 11/16/2024 at 12:11:25 PM  ** Final **      Assessment/Plan    74-year-old female with history of hypertension, hyperlipidemia, mild CAD, previous CVA, bilateral carotid stenosis status post enterectomy who presented to the hospital due to a mechanical fall. While in the ED, she was hypertensive (likely due to the pain) BMP did not show any acute finding. CBC showed white count of 18 likely stress-induced. Imaging workup did not show any acute findings except impacted basicervical fracture right femur with 1 cm impaction and varus deformity with extension into the greater and lesser trochanters. Her initial troponin was 33 with a repeat uptrending to 89, but no EKG changes.   Patient was seen by orthopedic plan for trochanteric nailing.  -Patient has up trended troponin 33 in ED up trended to 89>> 127>> 183.  -Cardiology was consulted, recommended echocardiogram to assess LV function and wall.  Which was unremarkable EF 60 to 65%  -Surgery was done in 12/5/2024    # Right impacted basicervical fracture of the right femur with 1 cm impaction versus deformity  # Mechanical fall  -Patient tripped at transition between concrete driveway and the grass.  Patient denies loss of consciousness  -Pedal pulse was normal bilaterally, no right lower limb swelling or change in color.  -Pelvis x-ray revealed acute comminuted, impacted, displaced and  angulated proximal right femoral intertrochanteric fracture.  No dislocation.   -Patient was seen by orthopedic plan for trochanteric nailing.  Today at 4 PM  Plan:  -Multimodal pain management: Dilaudid for severe pain, oxycodone for moderate pain  -Restart enoxaparin for DVT prophylaxis  -PT/OT     # Hypertension emergency  # Leukocytosis--improved  # Troponinemia  On admission patient was hypertensive 203/86.  She received 10 mg in ED. no blood pressure 144/66  -Leukocytosis 18 most likely due to stress due to mechanical fall  -Troponin up trending 33--89--127--189  -Cardiology was consulted, recommended echocardiogram to assess LV function and wall motion prior to surgery.  Which was unremarkable.  Plan:  -Hold valsartan given PASCUAL after surgery  -Continue carvedilol  -Continue to monitor CBC daily  -Cardiology recommending ischemic workup as outpatient such as nuclear stress test for further risk reduction.    PASCUAL: Most likely prerenal  Mild hyponatremia  Today creatinine up trended to 1.64 from 0.98 yesterday.  -Sodium down trended to 135 from 138 yesterday  -Most likely prerenal given patient was n.p.o. since 6 PM the day before surgery and yesterday to the surgery and the surgery was done around 4 PM.  Anticipate improvement of renal profile with oral intake    Plan:  Hold valsartan  Encourage oral intake  Input/output  Avoid nephrotoxic drug  Daily RFP       # HLD  # Bilateral carotid artery stenosis s/p left carotid endarterectomy  # Hx of CVA  Continue home medication as appropriate        Global plan of care:  Diet: Regular diet  Consult: Orthopedic  DVT prophylaxis: SCDs, enoxaparin  CODE STATUS: Full code  Disposition: Anticipate 2 to 3 days for placementThis assessment and plan was discussed with senior resident      Geraldine Ny MD  Internal medicine, PGY 1

## 2024-12-06 NOTE — DISCHARGE SUMMARY
Discharge Diagnosis  Closed 2-part intertrochanteric fracture of proximal end of femur with delayed healing, right    Issues Requiring Follow-Up  Follow-up with your PCP within 1 week for PASCUAL, post hospitalization follow-up.  Follow-up with your cardiology in 2 weeks for ischemic workup    Discharge Meds     Medication List      START taking these medications     oxyCODONE 5 mg immediate release tablet; Commonly known as: Roxicodone;   Take 1 tablet (5 mg) by mouth every 6 hours if needed for severe pain (7 -   10) or moderate pain (4 - 6) for up to 5 days.     CONTINUE taking these medications     aspirin 81 mg EC tablet   carvedilol 6.25 mg tablet; Commonly known as: Coreg   ferrous gluconate 324 (38 Fe) mg tablet; Commonly known as: Fergon   PARoxetine CR 12.5 mg 24 hr tablet; Commonly known as: Paxil-CR   rosuvastatin 20 mg tablet; Commonly known as: Crestor     STOP taking these medications     valsartan 160 mg tablet; Commonly known as: Diovan       Test Results Pending At Discharge  Pending Labs       No current pending labs.            Hospital Course  alisa Shepherd is a 74 y.o. female with PMHx HTN,  HLD, CVA 2020,Bilateral carotid artery stenosis S/P left carotid endarterectomy  2020, glaucoma.  She presented to [facility] due to mechanical fall.  Patient states that while she is attempting to get some packages from the back door she tripped at transition between concrete driveway and the grass.  She denies hitting her head, loss of consciousness.  She is not on anticoagulant.  In the ED she was hypertensive 203/86.  CBC remarkable for leukocytosis 18, CMP remarkable for hyperglycemia 135.  Troponin 33 > 89--127--189.  EKG showed sinus bradycardia, QTc 443.  No medication changes  Pelvis x-ray revealed acute comminuted, impacted, displaced and angulated proximal right femoral intertrochanteric fracture.  No dislocation.   -CT abdomen revealed impacted basicervical fracture right femoral with 1 cm impaction and  varus deformity.  This extends into the greater and lesser trochanters.   -In ED he received hydralazine for hypertension, morphine.  Orthopedic was consulted who recommended surgical intervention.  Patient was admitted, cardiology was consulted given up trended troponin, echo was ordered before surgery EF 60-65, unremarkable for ischemia.  Cardiology recommended cardiac workup for further outpatient ischemic workup as outpatient.  Patient underwent surgery 12/5 without any complication.  After surgery patient denies any pain, she can move her feet in the with (was normal bilaterally.  Her lab remarkable for PASCUAL given patient was n.p.o. for long time, hold valsartan.  Patient accepted in acute rehab, discharge to facility to repeat RFP in 1 week,if renal profile come back to normal she can go back to valsartan.  Cardiology recommended ischemic workup two weeks after discharge.  She was discharged to acute rehab in a good condition.    Pertinent Physical Exam At Time of Discharge  Physical Exam  Constitutional:       Appearance: Normal appearance.   HENT:      Head: Normocephalic and atraumatic.      Mouth/Throat:      Mouth: Mucous membranes are moist.   Cardiovascular:      Rate and Rhythm: Normal rate.      Heart sounds: No murmur heard.     No gallop.   Pulmonary:      Effort: No respiratory distress.      Breath sounds: No wheezing or rales.   Abdominal:      General: There is no distension.      Palpations: Abdomen is soft.      Tenderness: There is no abdominal tenderness.   Musculoskeletal:         General: No swelling or tenderness.      Right lower leg: No edema.      Left lower leg: No edema.      Comments: Pedal pulse was palpable bilaterally   Skin:     General: Skin is warm.   Neurological:      Mental Status: She is alert and oriented to person, place, and time.      Cranial Nerves: No cranial nerve deficit.   Psychiatric:         Mood and Affect: Mood normal.         Outpatient Follow-Up  Future  Appointments   Date Time Provider Department Center   11/17/2025  1:00 PM [facility] [id number] ULTRASOUND [id number] [facility]   11/26/2025 11:45 AM Diandra Mandujano MD [id number]         Geraldine Ny MD  Internal medicine, PGY1

## 2024-12-06 NOTE — PROGRESS NOTES
[facility] will not have a bed until the end of next week. Patient is also still in extended recovery status and will not have a qualifying stay. Placed call to patient's healthcare Bety BURKS. Explained patient will not have qualifying stay and discussed acute rehab. She would like referral to  [facility] to review.   Did discuss if they cannot accept, next option would be private pay at facility.   Asked [facility] to send referral to  [facility].     1232   [facility] can accept patient. Bety updated.

## 2024-12-06 NOTE — PROGRESS NOTES
Cardiology Progress Note           Rounding Cardiologist:  Dr. Veronica Strong  Primary Cardiologist:  Outside cardiology     Date:  12/6/2024  Patient:  Maegan Shepherd  YOB: 1950  MRN:  [id number]   Admit Date:  12/4/2024      SUBJECTIVE    Maegan Shepherd was seen and examined today at bedside.     Patient was seen today and is doing well.  Pain from her right leg is better and she was able to do physical therapy.  She was pleasantly surprised when it was not as bad as she had anticipated.    She has no chest pain or discomfort, no heaviness or tightness in her chest and no shortness of breath.  She said no palpitations, dizziness or lightheadedness.    EKG done this morning was reviewed and showed no acute findings.    Review of Systems     VITALS     Vitals:    12/05/24 2000 12/06/24 0000 12/06/24 0400 12/06/24 0700   BP: 101/50 112/54 108/54 105/51   BP Location: Right arm Left arm Left arm Left arm   Patient Position: Lying Lying Lying Lying   Pulse: 58 52 55 55   Resp: 18 18 18 17   Temp: 36.8 °C (98.2 °F) 37.4 °C (99.3 °F) 37.5 °C (99.5 °F) 37.4 °C (99.3 °F)   TempSrc: Temporal Temporal Temporal Temporal   SpO2: 99% 94% 99% 94%   Weight:       Height:           Intake/Output Summary (Last 24 hours) at 12/6/2024 1303  Last data filed at 12/6/2024 0400  Gross per 24 hour   Intake 1577.92 ml   Output 260 ml   Net 1317.92 ml       Vitals:    12/04/24 1911   Weight: 79.9 kg (176 lb 1.6 oz)       CURRENT MEDICATIONS    acetaminophen, 650 mg, oral, q6h LIZY  aspirin, 81 mg, oral, Daily  carvedilol, 6.25 mg, oral, BID  docusate sodium, 100 mg, oral, BID  enoxaparin, 30 mg, subcutaneous, q24h LIZY  ferrous sulfate, 150 mg, oral, Daily with breakfast  PARoxetine, 10 mg, oral, Daily  polyethylene glycol, 17 g, oral, Daily  rosuvastatin, 20 mg, oral, Every other day  [Held by provider] valsartan, 160 mg, oral, Daily      lactated Ringer's, 50 mL/hr, Last Rate: 50 mL/hr (12/05/24 2040)  oxygen, 2  "L/min      Current Outpatient Medications   Medication Instructions    aspirin 81 mg, oral, Daily    carvedilol (COREG) 6.25 mg, oral, 2 times daily    ferrous gluconate (Fergon) 324 (38 Fe) mg tablet 38 mg of iron, oral, Daily with breakfast    PARoxetine CR (PAXIL-CR) 12.5 mg, oral, Nightly    rosuvastatin (CRESTOR) 20 mg, oral, Every other day    valsartan (DIOVAN) 160 mg, oral, Daily        PHYSICAL EXAMINATION   GENERAL:  Well developed, well nourished, in no acute distress.  CHEST:  Symmetric and nontender.  NECK:  Supple, no JVD, no bruit.  LUNGS:  Clear to auscultation bilaterally, normal respiratory effort.  HEART: Regular rhythm occasionally irregular with a normal S1 and S2 without S3.  ABDOMEN: Soft, NT, ND without palpable organomegaly, normoactive bowel sounds.   EXTREMITIES:  Warm with good color, no clubbing or cyanosis.  There is no edema noted.  PERIPHERAL VASCULAR:  Pulses present and equally palpable; 2+ throughout.  NEURO/PSYCH:  Alert and oriented times three with approppriate behavior and responses.  INTEGUMENT: No rashes    LAB DATA     CBC:   Results from last 7 days   Lab Units 12/06/24  0431   WBC AUTO x10*3/uL 13.2*   RBC AUTO x10*6/uL 3.71*   HEMOGLOBIN g/dL 11.4*   HEMATOCRIT % 36.0   PLATELETS AUTO x10*3/uL 181        CMP:    Results from last 7 days   Lab Units 12/06/24  0431   SODIUM mmol/L 135*   POTASSIUM mmol/L 4.2   CHLORIDE mmol/L 104   CO2 mmol/L 25   BUN mg/dL 44*   CREATININE mg/dL 1.64*   GLUCOSE mg/dL 138*   CALCIUM mg/dL 8.6       Cardiac Enzymes:    Lab Results   Component Value Date    TROPHS 183 () 12/05/2024    TROPHS 117 () 12/04/2024    TROPHS 89 () 12/04/2024       Magnesium:    Lab Results   Component Value Date    MG 2.13 12/06/2024       Lipid Profile:  No results found for: \"CHLPL\", \"TRIG\", \"HDL\", \"LDLCALC\", \"LDLDIRECT\"    TSH:  No results found for: \"TSH\"    BNP: No results found for: \"BNP\"     PT/INR:    Lab Results   Component Value Date    PROTIME 12.3 " 12/04/2024    INR 1.1 12/04/2024       HgBA1c:    Lab Results   Component Value Date    HGBA1C 5.4 06/19/2023       CBC:  Lab Results   Component Value Date    WBC 13.2 (H) 12/06/2024    WBC 12.8 (H) 12/05/2024    WBC 18.0 (H) 12/04/2024    RBC 3.71 (L) 12/06/2024    RBC 4.89 12/05/2024    RBC 5.14 12/04/2024    HGB 11.4 (L) 12/06/2024    HGB 14.5 12/05/2024    HGB 15.6 12/04/2024    HCT 36.0 12/06/2024    HCT 46.0 12/05/2024    HCT 47.5 (H) 12/04/2024    MCV 97 12/06/2024    MCV 94 12/05/2024    MCV 92 12/04/2024    MCH 30.7 12/06/2024    MCH 29.7 12/05/2024    MCH 30.4 12/04/2024    MCHC 31.7 (L) 12/06/2024    MCHC 31.5 (L) 12/05/2024    MCHC 32.8 12/04/2024    RDW 12.9 12/06/2024    RDW 13.0 12/05/2024    RDW 12.6 12/04/2024     12/06/2024     12/05/2024     12/04/2024       BMP:  Lab Results   Component Value Date     (L) 12/06/2024     12/05/2024     12/04/2024    K 4.2 12/06/2024    K 4.1 12/05/2024    K 3.8 12/04/2024     12/06/2024     12/05/2024     12/04/2024    CO2 25 12/06/2024    CO2 25 12/05/2024    CO2 25 12/04/2024    BUN 44 (H) 12/06/2024    BUN 25 (H) 12/05/2024    BUN 22 12/04/2024    CREATININE 1.64 (H) 12/06/2024    CREATININE 0.98 12/05/2024    CREATININE 0.94 12/04/2024       Hepatic Function Panel:    Lab Results   Component Value Date    ALKPHOS 87 12/04/2024    ALT 41 12/04/2024    AST 35 12/04/2024    PROT 7.3 12/04/2024    BILITOT 1.0 12/04/2024         DIAGNOSTIC TESTING RESULTS     FL fluoro images no charge    Result Date: 12/5/2024  These images are not reportable by radiology and will not be interpreted by  Radiologists.    ECG 12 Lead    Result Date: 12/5/2024  Sinus bradycardia Possible Left atrial enlargement Borderline ECG When compared with ECG of 04-DEC-2024 15:36, QT has lengthened    Electrocardiogram, 12-lead    Result Date: 12/5/2024  Sinus bradycardia Possible Left atrial enlargement Borderline ECG When compared  with ECG of 05-DEC-2024 08:50, (unconfirmed) No significant change was found    XR femur right 2+ views    Result Date: 12/5/2024  Interpreted By:  Rogerio Mckay, STUDY: XR FEMUR RIGHT 2+ VIEWS;  12/5/2024 4:18 pm   INDICATION: Signs/Symptoms:need to make sure no other right femur fractures per Dr. Holbrook.   COMPARISON: None.   ACCESSION NUMBER(S): [id number]   ORDERING CLINICIAN: KASEY BARNES   FINDINGS: Impacted intertrochanteric fracture proximally. No additional femoral fractures identified.       Proximal right femoral fracture. No additional femoral fractures identified.   MACRO: None   Signed by: Rogerio Mckay 12/5/2024 4:22 PM Dictation workstation:   [id number]    Transthoracic Echo (TTE) Limited    Result Date: 12/5/2024            [facility] [address] [facility], [facility] [address]    Tel [phone] Fax [phone] TRANSTHORACIC ECHOCARDIOGRAM REPORT Patient Name:       ALEX MARTIN        Reading Physician:    [id number] Antione Stephens MD Study Date:         12/5/2024            Ordering Provider:    [id number] MCKEON MRN/PID:            [id number]             Fellow: Accession#:         [id number]         Nurse: Date of Birth/Age:  1950 / 74 years  Sonographer:          Madie Lowe RDCS Gender Assigned at  F                    Additional Staff: Birth: Height:             165.10 cm            Admit Date:           12/4/2024 Weight:             79.83 kg             Admission Status:     Inpatient -                                                                Priority                                                                discharge BSA / BMI:          1.87 m2 / 29.29      Department Location:  [facility]                     kg/m2 Blood Pressure: 147 /60 mmHg Study Type:    TRANSTHORACIC ECHO (TTE) LIMITED Diagnosis/ICD: Non ST elevation (NSTEMI)  myocardial infarction-I21.4; Abnormal                electrocardiogram [ECG] [EKG]-R94.31 Indication:    NSTEMI, Abn EKG CPT Codes:     Echo Limited-24324  Study Detail: The following Echo studies were performed: 2D, Doppler, M-Mode and               color flow.  PHYSICIAN INTERPRETATION: Left Ventricle: There is mild to moderate eccentric left ventricular hypertrophy involving the septal wall. There are no regional wall motion abnormalities. The left ventricular cavity size is normal. There is moderately increased septal and mildly increased posterior left ventricular wall thickness. There is left ventricular hypertrophy involving the septal wall. There is left ventricular concentric remodeling. Spectral Doppler shows a Grade I (impaired relaxation pattern) of left ventricular diastolic filling with normal left atrial filling pressure. LV Wall Scoring: All segments are normal. Left Atrium: The left atrium is upper limits of normal in size. Right Ventricle: The right ventricle is normal in size. There is normal right ventricular global systolic function. Right Atrium: The right atrium is normal in size. Aortic Valve: The aortic valve is trileaflet. There is mild aortic valve thickening. There is no evidence of aortic valve stenosis. The aortic valve dimensionless index is 0.92. There is no evidence of aortic valve regurgitation. The peak instantaneous gradient of the aortic valve is 10 mmHg. The mean gradient of the aortic valve is 6 mmHg. Mid cavitary minimal dyanamic gradient dagger shapped flow Less than 2 m/sec. Mitral Valve: The mitral valve is normal in structure. There is trace mitral valve regurgitation. Tricuspid Valve: The tricuspid valve is structurally normal. There is trace to mild tricuspid regurgitation. The Doppler estimated RVSP is within normal limits at 22.3 mmHg. Pulmonic Valve: The pulmonic valve is structurally normal. There is no indication of pulmonic valve regurgitation. Pericardium: No  pericardial effusion noted. Aorta: The aortic root is normal. There is no dilatation of the ascending aorta. There is no dilatation of the aortic root. Pulmonary Artery: The main pulmonary artery is normal in size, and position, with normal bifurcation into the left and right pulmonary arteries. Systemic Veins: The hepatic vein appears to be of normal size. The inferior vena cava appears normal in size, with IVC inspiratory collapse greater than 50%. The hepatic vein shows a normal flow pattern.  CONCLUSIONS:  1. Spectral Doppler shows a Grade I (impaired relaxation pattern) of left ventricular diastolic filling with normal left atrial filling pressure.  2. There is normal right ventricular global systolic function.  3. Right ventricular systolic pressure is within normal limits.  4. Aortic valve stenosis is not present.  5. There is moderately increased septal and mildly increased posterior left ventricular wall thickness. QUANTITATIVE DATA SUMMARY:  2D MEASUREMENTS:           Normal Ranges: IVSd:            1.29 cm   (0.6-1.1cm) LVPWd:           1.04 cm   (0.6-1.1cm) LVIDd:           4.15 cm   (3.9-5.9cm) LVIDs:           2.76 cm LV Mass Index:   89.4 g/m2 LV % FS          33.4 %  LV SYSTOLIC FUNCTION BY 2D PLANIMETRY (MOD):                   Normal Ranges: EF-A4C View: 77 % (>=55%) EF-A2C View: 88 % EF-Biplane:  87 %  LV DIASTOLIC FUNCTION:            Normal Ranges: MV Peak E:             0.66 m/s   (0.7-1.2 m/s) MV Peak A:             0.60 m/s   (0.42-0.7 m/s) E/A Ratio:             1.09       (1.0-2.2) MV e'                  0.085 m/s  (>8.0) MV lateral e'          0.09 m/s MV medial e'           0.08 m/s E/e' Ratio:            7.71       (<8.0) PulmV Sys Galo:         46.09 cm/s PulmV Aguero Galo:        41.55 cm/s PulmV S/D Galo:         1.11 PulmV A Revs Galo:      16.37 cm/s PulmV A Revs Dur:      85.63 msec  MITRAL VALVE:          Normal Ranges: MV DT:        248 msec (150-240msec)  AORTIC VALVE:                       Normal Ranges: AoV Vmax:                1.58 m/s  (<=1.7m/s) AoV Peak PG:             10.0 mmHg (<20mmHg) AoV Mean P.2 mmHg  (1.7-11.5mmHg) LVOT Max Galo:            1.39 m/s  (<=1.1m/s) AoV VTI:                 42.96 cm  (18-25cm) LVOT VTI:                39.59 cm LVOT Diameter:           1.84 cm   (1.8-2.4cm) AoV Area, VTI:           2.46 cm2  (2.5-5.5cm2) AoV Area,Vmax:           2.36 cm2  (2.5-4.5cm2) AoV Dimensionless Index: 0.92  TRICUSPID VALVE/RVSP:          Normal Ranges: Peak TR Velocity:     2.20 m/s RV Syst Pressure:     22 mmHg  (< 30mmHg)  Pulmonary Veins: PulmV A Revs Dur: 85.63 msec PulmV A Revs Galo: 16.37 cm/s PulmV Aguero Galo:   41.55 cm/s PulmV S/D Galo:    1.11 PulmV Sys Galo:    46.09 cm/s  [id number] Antione Stephens MD Electronically signed on 2024 at 12:53:14 PM  Wall Scoring  ** Final **     ECG 12 Lead    Result Date: 2024  Sinus bradycardia Possible Left atrial enlargement Otherwise normal ECG When compared with ECG of 11-SEP-2020 03:39, No significant change was found Confirmed by Humphrey Lehman ([id number]) on 2024 1:05:13 PM    CT chest abdomen pelvis w IV contrast    Result Date: 2024  Interpreted By:  Elizabet Villalba, STUDY: CT CHEST ABDOMEN PELVIS W IV CONTRAST;  2024 4:13 pm   INDICATION: Signs/Symptoms:Trauma.   COMPARISON: CT abdomen and pelvis 2009   ACCESSION NUMBER(S): [id number]   ORDERING CLINICIAN: AWAIS VILLA   TECHNIQUE: CT of the chest, abdomen, and pelvis was performed. Contiguous axial images were obtained at 3 mm slice thickness through the chest, abdomen and pelvis. Coronal and sagittal reconstructions at 3 mm slice thickness were performed. 90 mL Omnipaque 350   FINDINGS: CHEST:   LUNG/PLEURA/LARGE AIRWAYS: There is no infiltrate or pleural fluid.  There is no pulmonary nodule.   VESSELS: There is enlargement of the main pulmonary outflow tract and right and left main pulmonary arteries which may be secondary to pulmonary  artery hypertension.   HEART: The heart is unremarkable.   MEDIASTINUM AND JUSTINE: There is a 1.9 x 1.2 cm enlarged right hilar lymph node.   CHEST WALL AND LOWER NECK: The chest wall is unremarkable. There is no abnormality of the lower neck.   ABDOMEN:   LIVER: There is no hepatic mass.   BILE DUCTS: There is no intrahepatic, common hepatic or common bile ductal dilatation.   GALLBLADDER: Status post cholecystectomy.   PANCREAS: There is no abnormality of the pancreas.   SPLEEN: The spleen is unremarkable. There is no splenic mass. There is no splenomegaly   ADRENAL GLANDS: The adrenal glands are unremarkable.   KIDNEYS AND URETERS:. The kidneys function symmetrically. There is no renal mass. There is no intrarenal calculus or hydronephrosis.     PELVIS:   BLADDER: The bladder is unremarkable.   REPRODUCTIVE ORGANS: There is no abnormality of the reproductive organs.   BOWEL: There is no bowel wall thickening, dilatation or obstruction. There is a large amount of stool throughout the colon. The appendix is normal.   VESSELS: The abdominal and pelvic vessels are unremarkable.   PERITONEUM/RETROPERITONEUM/LYMPH NODES: There is no retroperitoneal or pelvic adenopathy.  There is no ascites.   ABDOMINAL WALL: The abdominal wall is unremarkable.   BONES AND SOFT TISSUES: There is an impacted basicervical fracture of the right femur with 1.0 cm impaction. There is a varus deformity. This extends into the greater and lesser trochanters.   There is no soft tissue abnormality.       CHEST: 1. No acute chest trauma. 2. Enlarged right hilar lymph node.   ABDOMEN AND PELVIS: 1. Impacted basicervical fracture right femur with 1 cm impaction and varus deformity. This extends into the greater and lesser trochanters. 2. Status post cholecystectomy. 3. Constipation.   MACRO: None   Signed by: Elizabet Villalba 12/4/2024 4:42 PM Dictation workstation:   [id number]    CT lumbar spine wo IV contrast    Result Date: 12/4/2024  Interpreted  By:  Elizabet Villalba, STUDY: CT LUMBAR SPINE WO IV CONTRAST  12/4/2024 4:20 pm   INDICATION: Signs/Symptoms:Trauma     COMPARISON: 08/25/2011   ACCESSION NUMBER(S): [id number]   ORDERING CLINICIAN: AWAIS VILLA   TECHNIQUE: Axial CT images of the lumbar spine are obtained. Axial, coronal and sagittal reconstructions are provided for review.   FINDINGS: There is moderate-to-marked anterior osteophytic spurring at all levels. Alignment: Within normal limits.   Vertebrae/Disc Spaces:   The vertebral body heights are intact. The disc spaces are preserved.   Lower Thoracic Spine:  There is no significant central canal stenosis in the included lower thoracic region.   T12-L1:  There is no significant central canal stenosis.   L1-2:  There is moderate ligamentum flavum and facet joint hypertrophy. There is posterior bony spondylosis extending more so to the left of midline. This causes mild central canal stenosis. The neural foramina are patent.   L2-3:  There is moderate ligamentum flavum and facet joint hypertrophy. There is no significant central canal or neural foraminal stenosis.   L3-4:  There is marked ligamentum flavum and facet joint hypertrophy with posterior bony spondylosis and diffuse disc bulge. There is marked central canal stenosis. The neural foramina are patent.   L4-5:  There is marked facet joint hypertrophy with a diffuse disc bulge. There is no central canal stenosis or neural foraminal compromise.   L5-S1: There is marked facet joint hypertrophy. There is no central canal stenosis or neural foraminal compromise. There is no significant central canal or neural foraminal stenosis.   Prevertebral/Paraspinal Soft Tissues: The prevertebral and paraspinal soft tissues are unremarkable.       1. No acute bony abnormality lumbar spine. 2. At L1-2, there is mild central canal stenosis. 3. At L3-4, there is marked central canal stenosis.   MACRO: None   Signed by: Elizabet Villalba 12/4/2024 4:37 PM Dictation  workstation:   [id number]    CT thoracic spine wo IV contrast    Result Date: 12/4/2024  Interpreted By:  Elizabet Villalba, STUDY: CT THORACIC SPINE WO IV CONTRAST;  12/4/2024 4:20 pm   INDICATION: Signs/Symptoms:Trauma.     COMPARISON: None.   ACCESSION NUMBER(S): [id number]   ORDERING CLINICIAN: AWAIS VILLA   TECHNIQUE: Axial CT images of the thoracic spine are obtained. Axial, coronal and sagittal reconstructions are submitted for review.   FINDINGS: There is moderate-to-marked anterior osteophytic spurring of the entire thoracic spine. There is a mild right convex thoracic scoliosis.   Alignment: Within normal limits.   Vertebrae/Intervertebral Discs: The thoracic vertebral body heights are intact. The disc spaces are preserved. There is no significant central canal stenosis.   Paraspinous Soft Tissues: Within normal limits.         No acute bony abnormality thoracic spine.   MACRO: None   Signed by: Elizabet Villalba 12/4/2024 4:33 PM Dictation workstation:   [id number]    CT head W O contrast trauma protocol    Result Date: 12/4/2024  Interpreted By:  Dontae Jason, STUDY: CT HEAD W/O CONTRAST TRAUMA PROTOCOL; CT CERVICAL SPINE WO IV CONTRAST;  12/4/2024 4:06 pm   INDICATION: Trauma. Signs/Symptoms:Trauma.     COMPARISON: September 10, 2020 head CT, September 11, 2020 MRI brain September 10, 2020 CT angiography head neck   ACCESSION NUMBER(S): [id number]; [id number]   ORDERING CLINICIAN: AWAIS VILLA   TECHNIQUE: Axial noncontrast head and cervical spine CT   FINDINGS: Compared with prior examinations newly seen mild focal encephalomalacia left posterior insular region related to remote infarct.   There is no evidence of acute intra, or extra-axial fluid collection, mass, nor mass effect. There is normal gray-white differentiation.   Paranasal sinuses: No significant abnormality.   Calvarium: No evident fracture.   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   Cervical  spine:   Alignment: Similar alignment including about 2 mm C3 anterolisthesis.   Cranial cervical junction: Intact.   Fracture: No acute fracture.   Vertebra and intervertebral disc spaces: Similar multilevel spondylosis and degenerative disc changes contributing to similar degree of canal and foraminal narrowing most pronounced at C5-6.   Paravertebral soft tissues: No evident hematoma. Scattered arterial vascular calcifications. There are metallic clips left neck. No there are bilateral thyroid nodules including dominant right lobe hypodense nodule image 210/14 estimated at 17 mm compared with about 14 mm September 10, 2020.   Brain Injury (BIG) guidelines CT values:   Skull fracture: No SDH (subdural hematoma): None detected EDH (epidural hemtoma): None detected IPH (intraparenchymal hemorrhage): None detected SAH (subarachnoid hemorrhage): None detected IVH (intraventricular hemorrhage): No   Reference: Doe FARFAN, Manuel RS, Titi M, et al. The BIG (brain injury guidelines) project: defining the management of traumatic brain injury by acute care surgeons. J Trauma Acute Care Surg. 2014;76:861e780.       Head: No acute intracranial abnormality was identified.   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   Cervical spine: No acute fracture or subluxation.   Similar moderate to advanced multilevel degenerative changes contributing to canal narrowing most pronounced C5-6.   Multinodular thyroid gland including dominant nodule measuring about 17 mm right lobe compared with 14 mm 2020 examination. Recommend baseline thyroid ultrasound.   MACRO: Incidental Finding:  There are few small hypoattenuating nodules measuring equal to or greater than 1.5 cm in the thyroid gland. (**-YCF-**)   Instructions:  Further evaluation with nonemergent thyroid ultrasound. (Managing Incidental Thyroid Nodules Detected on Imaging: White Paper of the ACR Incidental Thyroid Findings Committee. Josefina Rader. et al. Journal  of the American College of Radiology,Volume 12, Issue 2, 143 - 150.) [email or web address]     Signed by: Dontae Jason 12/4/2024 4:24 PM Dictation workstation:   [id number]    CT cervical spine wo IV contrast    Result Date: 12/4/2024  Interpreted By:  Dontae Jason, STUDY: CT HEAD W/O CONTRAST TRAUMA PROTOCOL; CT CERVICAL SPINE WO IV CONTRAST;  12/4/2024 4:06 pm   INDICATION: Trauma. Signs/Symptoms:Trauma.     COMPARISON: September 10, 2020 head CT, September 11, 2020 MRI brain September 10, 2020 CT angiography head neck   ACCESSION NUMBER(S): [id number]; [id number]   ORDERING CLINICIAN: AWAIS VILLA   TECHNIQUE: Axial noncontrast head and cervical spine CT   FINDINGS: Compared with prior examinations newly seen mild focal encephalomalacia left posterior insular region related to remote infarct.   There is no evidence of acute intra, or extra-axial fluid collection, mass, nor mass effect. There is normal gray-white differentiation.   Paranasal sinuses: No significant abnormality.   Calvarium: No evident fracture.   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   Cervical spine:   Alignment: Similar alignment including about 2 mm C3 anterolisthesis.   Cranial cervical junction: Intact.   Fracture: No acute fracture.   Vertebra and intervertebral disc spaces: Similar multilevel spondylosis and degenerative disc changes contributing to similar degree of canal and foraminal narrowing most pronounced at C5-6.   Paravertebral soft tissues: No evident hematoma. Scattered arterial vascular calcifications. There are metallic clips left neck. No there are bilateral thyroid nodules including dominant right lobe hypodense nodule image 210/14 estimated at 17 mm compared with about 14 mm September 10, 2020.   Brain Injury (BIG) guidelines CT values:   Skull fracture: No SDH (subdural hematoma): None detected EDH (epidural hemtoma): None detected IPH (intraparenchymal hemorrhage): None  detected SAH (subarachnoid hemorrhage): None detected IVH (intraventricular hemorrhage): No   Reference: Doe FARFAN, Manuel RS, Titi M, et al. The BIG (brain injury guidelines) project: defining the management of traumatic brain injury by acute care surgeons. J Trauma Acute Care Surg. 2014;76:726q972.       Head: No acute intracranial abnormality was identified.   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   Cervical spine: No acute fracture or subluxation.   Similar moderate to advanced multilevel degenerative changes contributing to canal narrowing most pronounced C5-6.   Multinodular thyroid gland including dominant nodule measuring about 17 mm right lobe compared with 14 mm 2020 examination. Recommend baseline thyroid ultrasound.   MACRO: Incidental Finding:  There are few small hypoattenuating nodules measuring equal to or greater than 1.5 cm in the thyroid gland. (**-YCF-**)   Instructions:  Further evaluation with nonemergent thyroid ultrasound. (Managing Incidental Thyroid Nodules Detected on Imaging: White Paper of the ACR Incidental Thyroid Findings Committee. Josefina Rader. et al. Journal of the American College of Radiology,Volume 12, Issue 2, 143 - 150.) [email or web address].ACR.IF.[email or web address]     Signed by: Dontae Jason 12/4/2024 4:24 PM Dictation workstation:   [id number]    XR pelvis 1-2 views    Result Date: 12/4/2024  Interpreted By:  Cecil Byers, STUDY: XR PELVIS 1-2 VIEWS;  12/4/2024 3:32 pm   INDICATION: Signs/Symptoms:trauma, right hip pain.   COMPARISON: Prior exam is from 04/29/2011..   ACCESSION NUMBER(S): [id number]   ORDERING CLINICIAN: AWAIS VILLA   TECHNIQUE: Portable AP view pelvis was obtained.   FINDINGS: Advanced sclerotic arthritic changes in both SI joints with partial arthritic fusion bilaterally. Distal lumbar spine disc space narrowing with endplate osteophytosis. Bilateral hip joint spaces are preserved. No lytic or blastic destructive bone lesion. There is an acute  comminuted fracture through the intertrochanteric proximal right femur with mild medial and proximal migration of the main shaft fragment relative to the main head/neck fragment, and subsequent angulation of those fragments. No dislocation. No other fracture. No opaque soft tissue foreign body. No periosteal reaction or erosion. Moderate stool in the imaged lower colon and rectum.       Acute comminuted, impacted, displaced, and angulated proximal right femoral intertrochanteric fracture. No dislocation.   MACRO: None   Signed by: Cecil Byers 12/4/2024 3:36 PM Dictation workstation:   [id number]    XR chest 1 view    Result Date: 12/4/2024  Interpreted By:  Peter Wilcox, STUDY: XR CHEST 1 VIEW;  12/4/2024 3:31 pm   INDICATION: Signs/Symptoms:trauma.   COMPARISON: Chest radiograph 09/10/2020   ACCESSION NUMBER(S): [id number]   ORDERING CLINICIAN: AWAIS VILLA   FINDINGS:     CARDIOMEDIASTINAL SILHOUETTE: Cardiomediastinal silhouette is normal in size and configuration.   LUNGS: No consolidation, pneumothorax, or significant effusion.   ABDOMEN: No remarkable upper abdominal findings.   BONES: No acute osseous changes.       1.  No evidence of acute cardiopulmonary process.     Signed by: Peter Wilcox 12/4/2024 3:33 PM Dictation workstation:   [id number]         RADIOLOGY     FL fluoro images no charge   Final Result      XR femur right 2+ views   Final Result   Proximal right femoral fracture.   No additional femoral fractures identified.        MACRO:   None        Signed by: Rogerio Mckay 12/5/2024 4:22 PM   Dictation workstation:   [id number]      Transthoracic Echo (TTE) Limited   Final Result      CT thoracic spine wo IV contrast   Final Result   No acute bony abnormality thoracic spine.        MACRO:   None        Signed by: Elizabet Villalba 12/4/2024 4:33 PM   Dictation workstation:   [id number]      CT lumbar spine wo IV contrast   Final Result   1. No acute bony abnormality lumbar spine.   2. At  L1-2, there is mild central canal stenosis.   3. At L3-4, there is marked central canal stenosis.        MACRO:   None        Signed by: Elizabet Villalba 12/4/2024 4:37 PM   Dictation workstation:   [id number]      CT chest abdomen pelvis w IV contrast   Final Result   CHEST:   1. No acute chest trauma.   2. Enlarged right hilar lymph node.        ABDOMEN AND PELVIS:   1. Impacted basicervical fracture right femur with 1 cm impaction and   varus deformity. This extends into the greater and lesser trochanters.   2. Status post cholecystectomy.   3. Constipation.        MACRO:   None        Signed by: Elizabet Villalba 12/4/2024 4:42 PM   Dictation workstation:   [id number]      CT head W O contrast trauma protocol   Final Result   Head: No acute intracranial abnormality was identified.        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        Cervical spine: No acute fracture or subluxation.        Similar moderate to advanced multilevel degenerative changes   contributing to canal narrowing most pronounced C5-6.        Multinodular thyroid gland including dominant nodule measuring about   17 mm right lobe compared with 14 mm 2020 examination. Recommend   baseline thyroid ultrasound.        MACRO:   Incidental Finding:  There are few small hypoattenuating nodules   measuring equal to or greater than 1.5 cm in the thyroid gland.   (**-YCF-**)        Instructions:  Further evaluation with nonemergent thyroid ultrasound.   (Managing Incidental Thyroid Nodules Detected on Imaging: White Paper   of the ACR Incidental Thyroid Findings Committee. Josefina Rader et   al. Journal of the American College of Radiology,Volume 12, Issue 2,   143 - 150.) [email or web address].ACR.IF.4             Signed by: Dontae Jason 12/4/2024 4:24 PM   Dictation workstation:   [id number]      CT cervical spine wo IV contrast   Final Result   Head: No acute intracranial abnormality was identified.        Findings related to remote cleft  insular region infarct.        Cervical spine: No acute fracture or subluxation.        Similar moderate to advanced multilevel degenerative changes   contributing to canal narrowing most pronounced C5-6.        Multinodular thyroid gland including dominant nodule measuring about   17 mm right lobe compared with 14 mm 2020 examination. Recommend   baseline thyroid ultrasound.        MACRO:   Incidental Finding:  There are few small hypoattenuating nodules   measuring equal to or greater than 1.5 cm in the thyroid gland.   (**-YCF-**)        Instructions:  Further evaluation with nonemergent thyroid ultrasound.   (Managing Incidental Thyroid Nodules Detected on Imaging: White Paper   of the ACR Incidental Thyroid Findings Committee. Josefina Rader et   al. Journal of the American College of Radiology,Volume 12, Issue 2,   143 - 150.) [email or web address].ACR.IF.4             Signed by: Dontae Jason 12/4/2024 4:24 PM   Dictation workstation:   [id number]      XR pelvis 1-2 views   Final Result   Acute comminuted, impacted, displaced, and angulated proximal right   femoral intertrochanteric fracture. No dislocation.        MACRO:   None        Signed by: Cecil Byers 12/4/2024 3:36 PM   Dictation workstation:   [id number]      XR chest 1 view   Final Result   1.  No evidence of acute cardiopulmonary process.             Signed by: Peter Wilcox 12/4/2024 3:33 PM   Dictation workstation:   [id number]            ASSESSMENT     Problem List Items Addressed This Visit       Abnormal EKG    Relevant Orders    Transthoracic Echo (TTE) Limited (Completed)    Closed fracture of right hip, initial encounter - Primary    Relevant Orders    Case Request Operating Room: Hip Fracture ORIF w/ Nail Trochanteric (Completed)    FL fluoro images no charge (Completed)     Other Visit Diagnoses       Fall, initial encounter        Multinodular thyroid        Relevant Orders    Referral to [facility]    Non-ST elevation  (NSTEMI) myocardial infarction (Multi)        Relevant Medications    carvedilol (Coreg) tablet 6.25 mg            Patient Active Problem List   Diagnosis    Abnormal EKG    Anxiety    Astigmatism of both eyes    Basal cell carcinoma (BCC)    Benign essential hypertension    Bicipital tenosynovitis    Plantar fasciitis    Bleeding from the nose    Bruise    Carotid artery disease (CMS-HCC)    Carotid stenosis    Cat scratch    Cerebral infarction    Colonic polyp    Congenital spondylolisthesis    Coronary atherosclerosis    Defect, congenital heart    Depression    Fibrocystic disease of breast    Fibromyalgia    Pain in limb    Fibromyositis    Gastroesophageal reflux disease    Generalized anxiety disorder    Glaucoma suspect of both eyes    Glaucoma    Grief    Hashimoto's disease    Hyperlipidemia    Mixed hyperlipidemia    Idiopathic peripheral neuropathy    Impaired fasting glucose    Insomnia    Iron deficiency    Joint disorder of knee    Osteoarthritis of knee    Osteoarthrosis    Localized, primary osteoarthritis    Low back pain    Multiple thyroid nodules    Myopia, bilateral    Nasal dryness    Nocturnal leg cramps    Other seborrheic keratosis    Other specified abnormal findings of blood chemistry    Overweight    Plantar callosity    Presbyopia    Pseudophakia    Reactive hypertension    Rosacea    Scar condition and fibrosis of skin    Sinus bradycardia    Sleep apnea    Sprain of wrist    Dry eye syndrome of both eyes    Tear film insufficiency    Umbilical hernia without obstruction and without gangrene    Vitamin D deficiency    Vitamin deficiency    Vitreous degeneration of both eyes    Pre-ulcerative corn or callous    Xerosis cutis    H/O carotid endarterectomy    Closed 2-part intertrochanteric fracture of proximal end of femur with delayed healing, right    Closed fracture of right hip, initial encounter    Hip fracture due to osteoporosis with routine healing       PLAN     1.  Non-STEMI  with abnormal EKG.  No evolutionary changes and still without signs or symptoms of angina.  Will continue with risk factor modification.  Would recommend that the patient have stress testing post discharge.  If she is unable to ambulate appropriately a pharmacologic stress test will be requested 2 weeks postdischarge.  2.  Hypertension.  Blood pressures are running a little on the low side and her labs suggest she is dehydrated with an increased BUN and creatinine.  Will consider giving her some supplemental fluids unless she is able to increase her oral hydration.  3.  CKD.  As above more picture of dehydration.  If her blood pressures remain on the low side would hold her carvedilol as well as her valsartan.  Please note that she did not have renal insufficiency prior to her surgery.  This is likely dehydration or hypotensively mediated.    Dr. So is covering over the weekend for any other issues outside of her renal function she agreed discharge to rehab when felt appropriate    Please do not hesitate to call with questions.  Electronically signed by Veronica Strong MD, on 12/6/2024 at 1:03 PM

## 2024-12-06 NOTE — PROGRESS NOTES
Occupational Therapy    Evaluation    Patient Name: Maegan Shepherd  MRN: [id number]  Today's Date: 12/6/2024  Time Calculation  Start Time: 1045  Stop Time: 1105  Time Calculation (min): 20 min  4111/4111-A  Eval only     Assessment  IP OT Assessment  Prognosis: Good  End of Session Communication: Bedside nurse  End of Session Patient Position: Bed, 3 rail up, Alarm on  Patient presents with decline in ADLs, functional transfers, and functional mobility tasks and would benefit from OT during acute stay to maximize functional independence and safety.  Patient requires 24 hours hands on assistance.  Recommend moderate intensity OT to maximize functional independence and safety.     Plan:  Treatment Interventions: ADL retraining, Functional transfer training, Patient/family training, Equipment evaluation/education, Compensatory technique education  OT Frequency: Daily  OT Discharge Recommendations: Moderate intensity level of continued care  OT - OK to Discharge: Yes from acute care OT services to the next level of care when cleared by medical team      Subjective   Current Problem:  1. Closed fracture of right hip, initial encounter  Case Request Operating Room: Hip Fracture ORIF w/ Nail Trochanteric    Case Request Operating Room: Hip Fracture ORIF w/ Nail Trochanteric    FL fluoro images no charge    FL fluoro images no charge    oxyCODONE (Roxicodone) 5 mg immediate release tablet      2. Abnormal EKG  Transthoracic Echo (TTE) Limited    Transthoracic Echo (TTE) Limited      3. Fall, initial encounter        4. Multinodular thyroid  Referral to [facility]      5. Non-ST elevation (NSTEMI) myocardial infarction (Multi)  Transthoracic Echo (TTE) Limited      6. PASCUAL (acute kidney injury) (CMS-HCC)  Renal function panel          General:  General  Reason for Referral: recent surgery  Referred By: Humphrey Holbrook MD  Past Medical History Relevant to Rehab: To hospital due to fall and confusion. Pt found  to have proximal femur fracture. Pt underwent Right hip closed reduction of an intertrochanteric hip fracture and placement of an intramedullary nail. PMH: HTN, HLD,CVA, bilateral carotid artery stenosis s/p left carotid endartectomy  Co-Treatment: PT  Co-Treatment Reason: for safety  Prior to Session Communication: Bedside nurse  Patient Position Received: Bed, 3 rail up, Alarm on  Preferred Learning Style: verbal  General Comment: Patient in long legged sitting in bed and agreeable to participate in OT evaluation.    Precautions:  Hearing/Visual Limitations: [place]  LE Weight Bearing Status: Weight Bearing as Tolerated (right LE)  Medical Precautions: Fall precautions    Pain:  Pain Assessment  Pain Assessment: 0-10  0-10 (Numeric) Pain Score:  (denies pain at rest but does report pain with movement, does not rate pain)  Pain Type: Surgical pain  Pain Location: Hip  Pain Orientation: Right  Pain Interventions: Rest    Objective   Cognition:  Overall Cognitive Status: Within Functional Limits  Orientation Level: Oriented X4  Problem Solving:  (impaired)  Safety/Judgement:  (impaired)  Insight: Mild    Home Living:  Home Living Comments: Lives in 1 story home alone with 3 total steps to enter. Has stair lift to basement. Has WIS with GB and shower chair.     Prior Function:  Prior Function Comments: Was independent with all ADLs and IADLs.  (+) driving.  Used cane for functional mobility tasks    ADL:  LE Dressing Assistance: Total (don socks)    Activity Tolerance:  Endurance: Decreased tolerance for upright activites    Bed Mobility/Transfers:   Bed Mobility  Bed Mobility:  (dependent assist of 2 supine to sit with increased time and effort to complete. Patient required mod assist for static sit balance with retro lean.)  Transfers  Transfer:  (Max assist of 2 attempt at sit to stand. Patient unable to come to full stance)    Extremities: RUE   RUE : Within Functional Limits and LUE   LUE: Within Functional  Limits    Outcome Measures: [facility] Daily Activity  Putting on and taking off regular lower body clothing: Total  Bathing (including washing, rinsing, drying): Total  Putting on and taking off regular upper body clothing: A lot  Toileting, which includes using toilet, bedpan or urinal: A lot  Taking care of personal grooming such as brushing teeth: A little  Eating Meals: None  Daily Activity - Total Score: 13    EDUCATION:  Education  Individual(s) Educated: Patient  Education Provided: Fall precautons (safety)  Patient Response to Education: Patient/Caregiver Verbalized Understanding of Information    Goals:   Encounter Problems       Encounter Problems (Active)       Functional Balance       STG-Patient will be independent with static sit balance >5 minutes for ADL participation  (Progressing)       Start:  12/06/24    Expected End:  12/20/24               Grooming       STG - Patient completes grooming with SBA (Progressing)       Start:  12/06/24    Expected End:  12/20/24               OT Transfers       STG-Patient will be min assist with functional transfers demonstrating good safety   (Progressing)       Start:  12/06/24    Expected End:  12/20/24

## 2024-12-06 NOTE — ANESTHESIA POSTPROCEDURE EVALUATION
Patient: Maegan Shepherd    Procedure Summary       Date: 12/05/24 Room / Location: [facility] OR 04 / Virtual [facility] OR    Anesthesia Start: 1654 Anesthesia Stop: 1810    Procedure: Hip Fracture ORIF w/ Nail Trochanteric (Right: Hip) Diagnosis:       Closed fracture of right hip, initial encounter      (Closed fracture of right hip, initial encounter [S72.001A])    Surgeons: Humphrey Holbrook MD Responsible Provider: Cleve Schofield MD    Anesthesia Type: general ASA Status: 3            Anesthesia Type: general    Vitals Value Taken Time   /59 12/05/24 1830   Temp 36.2 °C (97.2 °F) 12/05/24 1830   Pulse 48 12/05/24 1835   Resp 13 12/05/24 1835   SpO2 100 % 12/05/24 1835   Vitals shown include unfiled device data.    Anesthesia Post Evaluation    Patient location during evaluation: PACU  Patient participation: complete - patient participated  Level of consciousness: awake and alert  Pain management: satisfactory to patient  Airway patency: patent  Cardiovascular status: acceptable  Respiratory status: acceptable  Hydration status: acceptable  Postoperative Nausea and Vomiting: none        No notable events documented.

## 2024-12-06 NOTE — PROGRESS NOTES
12/06/24 1233   Discharge Planning   Support Systems Friends/neighbors   Home or Post Acute Services Post acute facilities (Rehab/SNF/etc)   RoundTrip coordination needed? Yes     [facility] able to accept patient. No precert needed. Medical team updated.     1546 Medically ready for discharge. Transport scheduled for 1900. Facility and contact Bety notified.

## 2024-12-09 NOTE — DOCUMENTATION CLARIFICATION NOTE
"    PATIENT:               ALEX MARTIN  ACCT #:                  [id number]  MRN:                       [id number]  :                       1950  ADMIT DATE:       2024 3:14 PM  DISCH DATE:        2024 9:19 PM  RESPONDING PROVIDER #:        [id number]          PROVIDER RESPONSE TEXT:    NSTEMI ruled in for this admission    CDI QUERY TEXT:    Clarification    Instruction:    Based on your assessment of the patient and the clinical information, please provide the requested documentation by clicking on the appropriate radio button and enter any additional information if prompted.    Question: Based on your medical judgment, can you please clarify in regard to NSTEMI    When answering this query, please exercise your independent professional judgment. The fact that a question is being asked, does not imply that any particular answer is desired or expected.    The patient's clinical indicators include:  Clinical Information: There is conflicting documentation in the medical record which requires clarification.    -The diagnosis of \"Non-STEMI with abnormal EKG\" was documented by Cardiology on .    -The diagnosis of \"Elevated troponins, clinically insignificant\" was documented by IM on DCN sent .    Clinical Indicators:  Troponin 33, 89, 117, 183 from -.    Initial EKG  \"Sinus bradycardia\", \"No significant change was found\" from previous EKG.    ECHO  \"There are no regional wall motion abnormalities.\"    Treatment: Troponin trend.  EKGs.  Telemetry.  ECHO.  Cardiology consult.  Coreg 6.25 mg PO two times daily on .  Hydralazine 10 mg IV on .  Crestor 20 mg PO every other day on .  Diovan 160 mg PO daily on .  Follow-up outpatient for ischemic workup w/plans for stress test.    Risk Factors: Elderly female s/p mechanical fall w/hx of HTN, CHF, CAD, previous MI, Obesity, ADRI, Former Smoker.  Options provided:  -- NSTEMI ruled in for this admission  -- NSTEMI ruled out for " this admission  -- Other - I will add my own diagnosis  -- Refer to Clinical Documentation Reviewer    Query created by: Stephanie Merida on 12/9/2024 8:39 AM      Electronically signed by:  ROXANNE MCKEON MD 12/9/2024 8:41 AM

## 2024-12-09 NOTE — DOCUMENTATION CLARIFICATION NOTE
"    PATIENT:               ALEX MARTIN  ACCT #:                  [id number]  MRN:                       [id number]  :                       1950  ADMIT DATE:       2024 3:14 PM  DISCH DATE:        2024 9:19 PM  RESPONDING PROVIDER #:        [id number]          PROVIDER RESPONSE TEXT:    Elevated troponins, clinically insignificant    CDI QUERY TEXT:    Clarification    Instruction:    Based on your assessment of the patient and the clinical information, please provide the requested documentation by clicking on the appropriate radio button and enter any additional information if prompted.    Question: Please clarify in regard to documentation of likely demand ischemia    When answering this query, please exercise your independent professional judgment. The fact that a question is being asked, does not imply that any particular answer is desired or expected.    The patient's clinical indicators include:  Clinical Information:  74 yr old female presenting after mechanical fall.  Found to have Right Displaced Femoral Neck Fracture s/p Right TFN.    Clinical Indicators:  Troponin 33, 89, 117, 183 from -.    Initial EKG  \"Sinus bradycardia\", \"No significant change was found\" from previous EKG.    ECHO  \"There are no regional wall motion abnormalities.\"    HP by IM  0900 AM states \"Most likely due to demand ischemia secondary to hypertension\".    Cardiology note  0925 AM states \"pt denies any sx of CP or SOB, there is no acute EKG finding\", \"POSSIBILITY OF PROGRESSIVE CAD IS HIGH BUT PATIENT IS ASYMPTOMATIC WITH NORMAL FUNCTION AND REGIONAL WALL MOTION.\"    IM note  1230 PM \"Troponinemia\", \"Cardiology was consulted, recommended echocardiogram to assess LV function and wall motion prior to surgery.  Which was unremarkable\".    Treatment: Troponin trend.  EKGs.  Telemetry.  ECHO.  Cardiology consult.  Coreg 6.25 mg PO two times daily on .  Hydralazine 10 mg IV on .  Crestor 20 " mg PO every other day on 12/6.  Diovan 160 mg PO daily on 12/5.    Risk Factors: Elderly female s/p mechanical fall w/hx of HTN, CHF, CAD, previous MI, Obesity, ADRI, Former Smoker.  Options provided:  -- Acute Non-ischemic Myocardial Injury present  -- Chronic Non-ischemic Myocardial Injury present  -- Elevated troponins, clinically insignificant  -- Other - I will add my own diagnosis  -- Refer to Clinical Documentation Reviewer    Query created by: Stephanie Merida on 12/6/2024 12:20 PM      Electronically signed by:  ROXANNE MCKEON MD 12/8/2024 8:41 PM

## 2024-12-10 ENCOUNTER — TELEPHONE (OUTPATIENT)
Dept: HEMATOLOGY/ONCOLOGY | Facility: HOSPITAL | Age: 74
End: 2024-12-10
Payer: MEDICARE

## 2024-12-10 DIAGNOSIS — E04.2 MULTIPLE THYROID NODULES: Primary | ICD-10-CM

## 2024-12-10 NOTE — TELEPHONE ENCOUNTER
Referral placed to [facility] by Dr. Newton, [facility] ED, for thyroid nodules, noted incidentally on CT cervical spine imaging 12/4/24. She was recently discharged from the hospital to a SNF, s/p proximal femur fracture. I called the patient to discuss her imaging results and the referral, as well as to advise routine thyroid US. No answer, I left  requesting return call to office. Diagnostic Clinic contact information provided. We will reach out to her again.  SAMIR Hoffman.CNP  [facility]

## 2024-12-11 NOTE — TELEPHONE ENCOUNTER
I called Ms. Shepherd again regarding her diagnostic clinic referral for multinodular thyroid & recommendations. No answer, I left  requesting return call to office. Diagnostic Clinic contact information provided.   SAMIR Hoffman.ZUHAIR  [facility]

## 2024-12-12 NOTE — TELEPHONE ENCOUNTER
I called Ms. Shepherd again regarding her thyroid nodules & recommendation for nonemergent thyroid US. No answer, I left  requesting return call to office. Diagnostic Clinic contact information provided. As I have been unable to contact her, I will mail a letter to her home address requesting she contact our office regarding the referral. I will close out the referral but gladly remain available to assist if she reaches out to us.  SAMIR Hoffman.ZUHAIR  [facility]

## 2024-12-15 LAB
ATRIAL RATE: 55 BPM
P AXIS: 53 DEGREES
P OFFSET: 204 MS
P ONSET: 147 MS
PR INTERVAL: 138 MS
Q ONSET: 216 MS
QRS COUNT: 9 BEATS
QRS DURATION: 90 MS
QT INTERVAL: 464 MS
QTC CALCULATION(BAZETT): 443 MS
QTC FREDERICIA: 450 MS
R AXIS: 43 DEGREES
T AXIS: 23 DEGREES
T OFFSET: 448 MS
VENTRICULAR RATE: 55 BPM

## 2024-12-18 ENCOUNTER — TELEPHONE (OUTPATIENT)
Dept: CARDIOLOGY | Facility: CLINIC | Age: 74
End: 2024-12-18
Payer: MEDICARE

## 2024-12-18 NOTE — TELEPHONE ENCOUNTER
Dr.Hannah Aiden Martinez contacted the office requesting to schedule this patient for a Pharmacological Stress Test - no order in chart for that currently.  said that per the patient's last appointment note with you it was recommended that this patient have that testing done. If you need to discuss with  her number is [phone]

## 2024-12-19 DIAGNOSIS — R79.89 ELEVATED TROPONIN: ICD-10-CM

## 2024-12-19 DIAGNOSIS — R94.31 ABNORMAL EKG: Primary | ICD-10-CM

## 2024-12-19 RX ORDER — REGADENOSON 0.08 MG/ML
0.4 INJECTION, SOLUTION INTRAVENOUS
OUTPATIENT
Start: 2024-12-19

## 2024-12-19 RX ORDER — AMINOPHYLLINE 25 MG/ML
125 INJECTION, SOLUTION INTRAVENOUS ONCE AS NEEDED
OUTPATIENT
Start: 2024-12-19

## 2024-12-20 ENCOUNTER — OFFICE VISIT (OUTPATIENT)
Dept: ORTHOPEDIC SURGERY | Facility: CLINIC | Age: 74
End: 2024-12-20
Payer: MEDICARE

## 2024-12-20 ENCOUNTER — DOCUMENTATION (OUTPATIENT)
Dept: ORTHOPEDIC SURGERY | Facility: CLINIC | Age: 74
End: 2024-12-20

## 2024-12-20 ENCOUNTER — HOSPITAL ENCOUNTER (OUTPATIENT)
Dept: RADIOLOGY | Facility: CLINIC | Age: 74
Discharge: HOME | End: 2024-12-20
Payer: MEDICARE

## 2024-12-20 DIAGNOSIS — S72.001S HIP FRACTURE, RIGHT, SEQUELA: ICD-10-CM

## 2024-12-20 PROCEDURE — 99211 OFF/OP EST MAY X REQ PHY/QHP: CPT | Performed by: ORTHOPAEDIC SURGERY

## 2024-12-20 PROCEDURE — 73502 X-RAY EXAM HIP UNI 2-3 VIEWS: CPT | Mod: RT

## 2024-12-20 NOTE — PROGRESS NOTES
Chief complaint: 2 weeks out right TFN    HPI: Patient is still achy and sore but she is up walking.  She is walking between 40-60 steps a day.  Staples are still in.  No fevers chills nausea vomiting.  She is in a rehab facility.    Physical exam: Incisions are well-healed.  The staples are getting little angry and probably should come out soon.  No signs of infection or redness or inflammation or abnormal findings.  She is neurovascular intact right lower extremity.    X-rays were ordered and reviewed today and show the implant in good position and fracture line of acceptably.    Assessment/plan: Patient doing well 2 weeks out right TFN.  She can be weightbearing as tolerated.  Activities as tolerated.  Will see her back in 4 weeks for AP lateral x-rays of the right hip

## 2024-12-20 NOTE — LETTER
December 20, 2024     Patient: Maegan Shepherd   YOB: 1950   Date of Visit: 12/20/2024       To Whom It May Concern:    Maegan Shepherd was seen in my clinic on 12/20/2024.    *May remove staples    *Activities as tolerated    *Continue with physical therapy    *F/U in 4 weeks    If you have any questions or concerns, please don't hesitate to call.         Sincerely,         DR. Humphrey Holbrook MD        CC: No Recipients

## 2024-12-21 LAB
ATRIAL RATE: 55 BPM
ATRIAL RATE: 56 BPM
P AXIS: 61 DEGREES
P AXIS: 63 DEGREES
P OFFSET: 199 MS
P OFFSET: 202 MS
P ONSET: 148 MS
P ONSET: 149 MS
PR INTERVAL: 142 MS
PR INTERVAL: 144 MS
Q ONSET: 220 MS
Q ONSET: 220 MS
QRS COUNT: 9 BEATS
QRS COUNT: 9 BEATS
QRS DURATION: 90 MS
QRS DURATION: 90 MS
QT INTERVAL: 474 MS
QT INTERVAL: 478 MS
QTC CALCULATION(BAZETT): 457 MS
QTC CALCULATION(BAZETT): 457 MS
QTC FREDERICIA: 463 MS
QTC FREDERICIA: 464 MS
R AXIS: 59 DEGREES
R AXIS: 60 DEGREES
T AXIS: 37 DEGREES
T AXIS: 37 DEGREES
T OFFSET: 457 MS
T OFFSET: 459 MS
VENTRICULAR RATE: 55 BPM
VENTRICULAR RATE: 56 BPM

## 2024-12-26 DIAGNOSIS — E04.2 MULTIPLE THYROID NODULES: Primary | ICD-10-CM

## 2025-01-17 ENCOUNTER — HOSPITAL ENCOUNTER (OUTPATIENT)
Dept: RADIOLOGY | Facility: CLINIC | Age: 75
Discharge: HOME | End: 2025-01-17
Payer: MEDICARE

## 2025-01-17 ENCOUNTER — OFFICE VISIT (OUTPATIENT)
Dept: ORTHOPEDIC SURGERY | Facility: CLINIC | Age: 75
End: 2025-01-17
Payer: MEDICARE

## 2025-01-17 DIAGNOSIS — S72.001S HIP FRACTURE, RIGHT, SEQUELA: ICD-10-CM

## 2025-01-17 PROCEDURE — 99211 OFF/OP EST MAY X REQ PHY/QHP: CPT | Performed by: ORTHOPAEDIC SURGERY

## 2025-01-17 PROCEDURE — 73502 X-RAY EXAM HIP UNI 2-3 VIEWS: CPT | Mod: RT

## 2025-01-17 NOTE — PROGRESS NOTES
Chief complaint: 6 weeks out right TFN    HPI: All in all patient is doing okay.  She says her groin pain is not as bad.  She notices some stiffness and tightness in the thigh buttock and hamstring area.  She is ambulating but slowly.  She is slowly improving.  No fevers chills nausea vomiting.  No calf pain.  No numbness and tingling.    Physical exam: Incisions are well-healed.  She has a mild hematoma in the proximal incision there which is palpable but there is no redness warmth swelling or puffiness or signs of infection.  Hip moves smoothly with an acceptable and expected amount of pain for 6 weeks out from the surgery.  Good strength in the ankles bilaterally.    X-rays were ordered and reviewed and it shows hardware in good position fracture lined up nicely.    Assessment/plan: Patient doing well after right TFN.  Will let her engage in 50s as tolerated and weightbearing as tolerated.  She will follow-up with us in 2 months for AP lateral x-rays of the right hip.  She will come see us sooner if she has any questions or concerns.

## 2025-03-18 ENCOUNTER — OFFICE VISIT (OUTPATIENT)
Dept: ORTHOPEDIC SURGERY | Facility: CLINIC | Age: 75
End: 2025-03-18
Payer: MEDICARE

## 2025-03-18 ENCOUNTER — HOSPITAL ENCOUNTER (OUTPATIENT)
Dept: RADIOLOGY | Facility: CLINIC | Age: 75
Discharge: HOME | End: 2025-03-18
Payer: MEDICARE

## 2025-03-18 DIAGNOSIS — S72.001S HIP FRACTURE, RIGHT, SEQUELA: Primary | ICD-10-CM

## 2025-03-18 DIAGNOSIS — S72.001S HIP FRACTURE, RIGHT, SEQUELA: ICD-10-CM

## 2025-03-18 PROCEDURE — 1157F ADVNC CARE PLAN IN RCRD: CPT | Performed by: ORTHOPAEDIC SURGERY

## 2025-03-18 PROCEDURE — 99213 OFFICE O/P EST LOW 20 MIN: CPT | Performed by: ORTHOPAEDIC SURGERY

## 2025-03-18 PROCEDURE — 1159F MED LIST DOCD IN RCRD: CPT | Performed by: ORTHOPAEDIC SURGERY

## 2025-03-18 PROCEDURE — 73502 X-RAY EXAM HIP UNI 2-3 VIEWS: CPT | Mod: RIGHT SIDE | Performed by: ORTHOPAEDIC SURGERY

## 2025-03-18 PROCEDURE — 1036F TOBACCO NON-USER: CPT | Performed by: ORTHOPAEDIC SURGERY

## 2025-03-18 PROCEDURE — 73502 X-RAY EXAM HIP UNI 2-3 VIEWS: CPT | Mod: RT

## 2025-03-18 NOTE — PROGRESS NOTES
Maegan Shepherd is a 74 y.o. female who presents for Follow-up of the Right Hip (12/5/24 Rt TFN/3 1/2 months out/X-rays today).    HPI:  74-year old female here for surgical follow-up.  She is 3-1/2 months out from a right TFN.  She denies any fever chills nausea vomiting night sweats.  She has no bowel or bladder complaints.    Physical exam:  Well-nourished, well kept.No lymphangitis or lymphadenopathy in the examined extremities.    Can stand on heels and toes with some difficulty, she can rise from a seated position and sit from a standing position with difficulty, she is in a wheelchair today for comfort.  Examination of the lower extremities reveals no point tenderness, swelling, or deformity.  Range of motion of the hips, knees, and ankles are full without crepitance, instability, or exacerbation of pain.  Strength is 5/5 throughout except she has some very mild weakness with her right knee flexion extension and hip flexion.  No redness, abrasions, or lesions on extremities  Gross sensation intact in the extremities. Affect normal.  Alert and oriented ×3.  Coordination normal.    Imaging studies:  X-rays of the right hip were ordered and reviewed today.    Assessment:  74-year-old female here for surgical follow-up.  She is 3-1/2 months out from a right TFN.  She is back home and out of the rehab facility.  She is doing okay.  She was ambulating well with a walker, she has been doing in-home physical therapy and they have encouraged her to try to ambulate without a walker.  She has been trying that but has noticed that she is getting some increased right hip and some thigh pain as a result.  She has not done any outpatient therapy at this point.      We have ordered and reviewed tests, x-rays.  She is here with a family member who is a helpful and necessary historian.  We reviewed notes from her rehab physician from March 5, 2025 this does discuss her ambulation and hip status.  This is a patient with an  exacerbation of a chronic problem that is affecting her bodily function.    For complete plan and/or surgical details, please refer to Dr. Holbrook's portion of this split dictation.    -Dominic Morocho PA-C    In a face-to-face encounter, I performed a history and physical examination, discussed pertinent diagnostic studies if indicated, and discussed diagnosis and management strategies with both the patient and the midlevel provider.  I reviewed the midlevel's note and agree with the documented findings and plan of care.    Patient doing well 3-1/2 months out right TFN.  X-rays were ordered and reviewed today and looks like she is healed in nicely.  She has acceptable amount of shortening.  Will let her engage in activities as tolerated and she can follow-up with us in apparent basis.  She has 2 chronic stable problems of some hip pain and a little bit of thigh pain.  She is ambulating with a walker.    Humphrey Holbrook MD  Orthopedic surgery

## 2025-11-17 ENCOUNTER — APPOINTMENT (OUTPATIENT)
Dept: CARDIOLOGY | Facility: HOSPITAL | Age: 75
End: 2025-11-17
Payer: MEDICARE

## 2025-11-26 ENCOUNTER — APPOINTMENT (OUTPATIENT)
Dept: VASCULAR SURGERY | Facility: CLINIC | Age: 75
End: 2025-11-26
Payer: MEDICARE

## (undated) DEVICE — Device

## (undated) DEVICE — SOLUTION, IRRIGATION, SODIUM CHLORIDE 0.9%, 1000 ML, POUR BOTTLE

## (undated) DEVICE — TOWEL PACK, STERILE, 4/PACK, BLUE

## (undated) DEVICE — DRILL BIT, 4.2MM 3-FLUTED QC 330MM 100MM CALB

## (undated) DEVICE — DRAPE, ISOLATION, ANTIMICROBIAL, W/POUCH, IOBAN 2, STERI DRAPE, 125 X 83 IN, DISPOSABLE, STERILE

## (undated) DEVICE — APPLICATOR, CHLORAPREP, W/ORANGE TINT, 26ML

## (undated) DEVICE — SUTURE, MONOCRYL, 3-0, 27 IN, PS-2, UNDYED

## (undated) DEVICE — SUTURE, VICRYL, 0, 36 IN, CT-1, UNDYED

## (undated) DEVICE — SUTURE, VICRYL, 2-0, 36 IN, CT-1, UNDYED

## (undated) DEVICE — GLOVE, SURGICAL, PROTEXIS PI , 7.5, PF, LF

## (undated) DEVICE — WOUND SYSTEM, DEBRIDEMENT & CLEANING, O.R DUOPAK

## (undated) DEVICE — SOLUTION, IRRIGATION, STERILE WATER, 1000 ML, POUR BOTTLE

## (undated) DEVICE — GLOVE, SURGICAL, PROTEXIS PI BLUE W/NEUTHERA, 7.5, PF, LF

## (undated) DEVICE — GLOVE, SURGICAL, PROTEXIS PI BLUE W/NEUTHERA, 8.0, PF, LF

## (undated) DEVICE — GUIDEWIRE, 3.2 X 400

## (undated) DEVICE — DRESSING, MEPILEX BORDER, POST-OP AG, 4 X 6 IN